# Patient Record
Sex: MALE | Race: WHITE | NOT HISPANIC OR LATINO | Employment: OTHER | URBAN - NONMETROPOLITAN AREA
[De-identification: names, ages, dates, MRNs, and addresses within clinical notes are randomized per-mention and may not be internally consistent; named-entity substitution may affect disease eponyms.]

---

## 2019-07-05 RX ORDER — SPIRONOLACTONE 25 MG/1
TABLET ORAL
COMMUNITY
Start: 2017-07-31 | End: 2021-04-28

## 2019-07-05 RX ORDER — PRAVASTATIN SODIUM 40 MG
TABLET ORAL EVERY 24 HOURS
COMMUNITY
Start: 2017-09-22 | End: 2019-07-08 | Stop reason: SDUPTHER

## 2019-07-05 RX ORDER — NITROGLYCERIN 0.4 MG/1
0.4 TABLET SUBLINGUAL
COMMUNITY
Start: 2013-12-26

## 2019-07-05 RX ORDER — OMEPRAZOLE 20 MG/1
20 CAPSULE, DELAYED RELEASE ORAL DAILY
COMMUNITY
Start: 2013-12-26

## 2019-07-05 RX ORDER — AMLODIPINE BESYLATE 5 MG/1
TABLET ORAL
COMMUNITY
Start: 2013-08-25 | End: 2021-12-14

## 2019-07-05 RX ORDER — ATENOLOL 50 MG/1
TABLET ORAL EVERY 24 HOURS
COMMUNITY
Start: 2017-10-11 | End: 2020-02-06 | Stop reason: SDUPTHER

## 2019-07-05 RX ORDER — ISOSORBIDE MONONITRATE 60 MG/1
TABLET, EXTENDED RELEASE ORAL EVERY 12 HOURS
COMMUNITY
Start: 2017-09-26 | End: 2020-04-08 | Stop reason: SDUPTHER

## 2019-07-05 RX ORDER — BUMETANIDE 0.5 MG/1
TABLET ORAL
COMMUNITY
Start: 2017-07-31 | End: 2020-11-10 | Stop reason: ALTCHOICE

## 2019-07-05 RX ORDER — ISOSORBIDE MONONITRATE 60 MG/1
TABLET, EXTENDED RELEASE ORAL
Qty: 180 TABLET | Refills: 2 | Status: SHIPPED | OUTPATIENT
Start: 2019-07-05 | End: 2020-04-08 | Stop reason: SDUPTHER

## 2019-07-05 RX ORDER — ASPIRIN 325 MG
TABLET ORAL
Status: ON HOLD | COMMUNITY
Start: 2013-12-26 | End: 2021-12-27

## 2019-07-05 RX ORDER — CLOPIDOGREL BISULFATE 75 MG/1
TABLET ORAL EVERY 24 HOURS
COMMUNITY
Start: 2017-09-22 | End: 2019-10-23 | Stop reason: SDUPTHER

## 2019-07-05 RX ORDER — ISOSORBIDE MONONITRATE 60 MG/1
TABLET, EXTENDED RELEASE ORAL EVERY 12 HOURS
Status: CANCELLED | OUTPATIENT
Start: 2019-07-05

## 2019-07-05 RX ORDER — ALBUTEROL SULFATE 2.5 MG/3ML
SOLUTION RESPIRATORY (INHALATION)
Status: ON HOLD | COMMUNITY
Start: 2013-12-26 | End: 2021-12-27

## 2019-07-08 RX ORDER — PRAVASTATIN SODIUM 40 MG
TABLET ORAL
Qty: 90 TABLET | Refills: 2 | Status: SHIPPED | OUTPATIENT
Start: 2019-07-08 | End: 2020-04-27 | Stop reason: SDUPTHER

## 2019-10-23 RX ORDER — CLOPIDOGREL BISULFATE 75 MG/1
TABLET ORAL
Qty: 90 TABLET | Refills: 3 | Status: SHIPPED | OUTPATIENT
Start: 2019-10-23 | End: 2020-10-13

## 2020-01-14 ENCOUNTER — OUTSIDE FACILITY SERVICE (OUTPATIENT)
Dept: CARDIOLOGY | Facility: CLINIC | Age: 75
End: 2020-01-14

## 2020-01-14 PROCEDURE — 93010 ELECTROCARDIOGRAM REPORT: CPT | Performed by: INTERNAL MEDICINE

## 2020-01-14 PROCEDURE — 99214 OFFICE O/P EST MOD 30 MIN: CPT | Performed by: INTERNAL MEDICINE

## 2020-02-06 RX ORDER — ATENOLOL 50 MG/1
50 TABLET ORAL EVERY 24 HOURS
Qty: 90 TABLET | Refills: 3 | Status: SHIPPED | OUTPATIENT
Start: 2020-02-06 | End: 2020-11-09 | Stop reason: SDUPTHER

## 2020-04-08 RX ORDER — ISOSORBIDE MONONITRATE 60 MG/1
60 TABLET, EXTENDED RELEASE ORAL 2 TIMES DAILY
Qty: 180 TABLET | Refills: 2 | Status: SHIPPED | OUTPATIENT
Start: 2020-04-08 | End: 2021-01-04

## 2020-04-27 RX ORDER — PRAVASTATIN SODIUM 40 MG
40 TABLET ORAL DAILY
Qty: 90 TABLET | Refills: 3 | Status: SHIPPED | OUTPATIENT
Start: 2020-04-27 | End: 2022-07-19 | Stop reason: SDUPTHER

## 2020-06-30 ENCOUNTER — TELEPHONE (OUTPATIENT)
Dept: CARDIOLOGY | Facility: CLINIC | Age: 75
End: 2020-06-30

## 2020-07-02 RX ORDER — TRIAMCINOLONE ACETONIDE 5 MG/G
1 OINTMENT TOPICAL 2 TIMES DAILY PRN
COMMUNITY
Start: 2019-01-10

## 2020-07-02 RX ORDER — METOPROLOL SUCCINATE 25 MG/1
TABLET, EXTENDED RELEASE ORAL
COMMUNITY
Start: 2018-06-06 | End: 2020-11-10 | Stop reason: ALTCHOICE

## 2020-07-02 RX ORDER — PANTOPRAZOLE SODIUM 40 MG/1
TABLET, DELAYED RELEASE ORAL
COMMUNITY
Start: 2018-06-06 | End: 2021-04-28

## 2020-07-02 RX ORDER — HYDROCODONE BITARTRATE AND ACETAMINOPHEN 7.5; 325 MG/1; MG/1
1 TABLET ORAL EVERY 6 HOURS PRN
COMMUNITY
Start: 2018-06-06

## 2020-07-02 NOTE — TELEPHONE ENCOUNTER
There was also a letter attached to cardiac clearance request from Dr Juliana Swenson Pre Admissions Testing [facility] , asking if Dr Burgos thinks pt should have echo or stress test before surgery.     Dr Burgos responded saying yes suggest echo/lexiscan myoview     I called Dr Swenson and LM asking her to call our office.      I put the papers on Sari's desk per her request.

## 2020-07-06 NOTE — TELEPHONE ENCOUNTER
Sari spoke with Dr Swenson's office and let them know that Dr Burgos did suggest an echo and stress test. She was told that they will order the testing and will call us back if they need anything else. Their request has been scanned in.

## 2020-10-13 RX ORDER — CLOPIDOGREL BISULFATE 75 MG/1
TABLET ORAL
Qty: 90 TABLET | Refills: 0 | Status: SHIPPED | OUTPATIENT
Start: 2020-10-13 | End: 2021-04-21

## 2020-11-10 RX ORDER — BUMETANIDE 1 MG/1
0.5 TABLET ORAL EVERY MORNING
COMMUNITY
Start: 2020-10-11

## 2020-11-10 RX ORDER — ATENOLOL 50 MG/1
50 TABLET ORAL EVERY 24 HOURS
Qty: 90 TABLET | Refills: 3 | Status: SHIPPED | OUTPATIENT
Start: 2020-11-10 | End: 2021-11-15 | Stop reason: SDUPTHER

## 2021-01-04 RX ORDER — ISOSORBIDE MONONITRATE 60 MG/1
TABLET, EXTENDED RELEASE ORAL
Qty: 180 TABLET | Refills: 0 | Status: SHIPPED | OUTPATIENT
Start: 2021-01-04 | End: 2021-01-05

## 2021-01-05 RX ORDER — ISOSORBIDE MONONITRATE 60 MG/1
TABLET, EXTENDED RELEASE ORAL
Qty: 180 TABLET | Refills: 0 | Status: SHIPPED | OUTPATIENT
Start: 2021-01-05 | End: 2021-03-30

## 2021-01-12 ENCOUNTER — OUTSIDE FACILITY SERVICE (OUTPATIENT)
Dept: CARDIOLOGY | Facility: CLINIC | Age: 76
End: 2021-01-12

## 2021-01-12 PROCEDURE — 93010 ELECTROCARDIOGRAM REPORT: CPT | Performed by: INTERNAL MEDICINE

## 2021-01-12 PROCEDURE — 99214 OFFICE O/P EST MOD 30 MIN: CPT | Performed by: INTERNAL MEDICINE

## 2021-01-15 ENCOUNTER — TELEPHONE (OUTPATIENT)
Dept: CARDIOLOGY | Facility: CLINIC | Age: 76
End: 2021-01-15

## 2021-01-15 RX ORDER — CHLORAL HYDRATE 500 MG
CAPSULE ORAL
COMMUNITY
End: 2021-12-14

## 2021-01-15 NOTE — TELEPHONE ENCOUNTER
Patient called stating that he was not able to get the Ranexa prescription because it was more than $200. Any alternatives?

## 2021-01-18 NOTE — TELEPHONE ENCOUNTER
Patient's wife states that the Amlodipine made the patient feel ill and made his chest hurt even more after just 1 dose. Please advise.

## 2021-01-18 NOTE — TELEPHONE ENCOUNTER
If stops the amlodipine the blood pressure needs to be monitored closely  Also if continues to have symptoms of chest pain schedule appointment for follow-up

## 2021-01-18 NOTE — TELEPHONE ENCOUNTER
Pt wife called back and stated pt has been taking isosorbide 60mg BID for a long time.  Stated had CP last night and it woke him out of his sleep and he had to take nitro 2 times.  He has only had this CP wake him up out of his sleep since he started the amlodipine but he stopped taking it after the 2nd day of trying it.  Please advise.

## 2021-03-30 ENCOUNTER — OUTSIDE FACILITY SERVICE (OUTPATIENT)
Dept: CARDIOLOGY | Facility: CLINIC | Age: 76
End: 2021-03-30

## 2021-03-30 PROCEDURE — 99214 OFFICE O/P EST MOD 30 MIN: CPT | Performed by: INTERNAL MEDICINE

## 2021-03-30 RX ORDER — ISOSORBIDE MONONITRATE 60 MG/1
TABLET, EXTENDED RELEASE ORAL
Qty: 180 TABLET | Refills: 0 | Status: SHIPPED | OUTPATIENT
Start: 2021-03-30 | End: 2021-07-01

## 2021-04-06 ENCOUNTER — OUTSIDE FACILITY SERVICE (OUTPATIENT)
Dept: CARDIOLOGY | Facility: CLINIC | Age: 76
End: 2021-04-06

## 2021-04-06 PROCEDURE — 93306 TTE W/DOPPLER COMPLETE: CPT | Performed by: INTERNAL MEDICINE

## 2021-04-21 RX ORDER — CLOPIDOGREL BISULFATE 75 MG/1
TABLET ORAL
Qty: 90 TABLET | Refills: 0 | Status: SHIPPED | OUTPATIENT
Start: 2021-04-21 | End: 2021-07-19

## 2021-04-27 ENCOUNTER — OUTSIDE FACILITY SERVICE (OUTPATIENT)
Dept: CARDIOLOGY | Facility: CLINIC | Age: 76
End: 2021-04-27

## 2021-04-27 PROCEDURE — 99213 OFFICE O/P EST LOW 20 MIN: CPT | Performed by: INTERNAL MEDICINE

## 2021-07-01 RX ORDER — ISOSORBIDE MONONITRATE 60 MG/1
TABLET, EXTENDED RELEASE ORAL
Qty: 180 TABLET | Refills: 0 | Status: SHIPPED | OUTPATIENT
Start: 2021-07-01 | End: 2021-10-14 | Stop reason: SDUPTHER

## 2021-07-19 RX ORDER — CLOPIDOGREL BISULFATE 75 MG/1
TABLET ORAL
Qty: 90 TABLET | Refills: 3 | Status: SHIPPED | OUTPATIENT
Start: 2021-07-19 | End: 2022-07-11 | Stop reason: SDUPTHER

## 2021-10-14 RX ORDER — ISOSORBIDE MONONITRATE 60 MG/1
60 TABLET, EXTENDED RELEASE ORAL 2 TIMES DAILY
Qty: 180 TABLET | Refills: 0 | Status: SHIPPED | OUTPATIENT
Start: 2021-10-14 | End: 2022-01-10

## 2021-11-15 ENCOUNTER — TELEPHONE (OUTPATIENT)
Dept: CARDIOLOGY | Facility: CLINIC | Age: 76
End: 2021-11-15

## 2021-11-15 RX ORDER — ATENOLOL 50 MG/1
50 TABLET ORAL EVERY 24 HOURS
Qty: 90 TABLET | Refills: 3 | Status: SHIPPED | OUTPATIENT
Start: 2021-11-15 | End: 2022-11-14

## 2021-12-07 ENCOUNTER — OUTSIDE FACILITY SERVICE (OUTPATIENT)
Dept: CARDIOLOGY | Facility: CLINIC | Age: 76
End: 2021-12-07

## 2021-12-07 DIAGNOSIS — R79.1 ABNORMAL COAGULATION PROFILE: ICD-10-CM

## 2021-12-07 DIAGNOSIS — I20.8 STABLE ANGINA PECTORIS (HCC): Primary | ICD-10-CM

## 2021-12-07 PROCEDURE — 99214 OFFICE O/P EST MOD 30 MIN: CPT | Performed by: INTERNAL MEDICINE

## 2021-12-09 PROBLEM — I20.89 STABLE ANGINA PECTORIS: Status: ACTIVE | Noted: 2021-12-09

## 2021-12-09 PROBLEM — I20.8 STABLE ANGINA PECTORIS (HCC): Status: ACTIVE | Noted: 2021-12-09

## 2021-12-14 RX ORDER — SPIRONOLACTONE 25 MG/1
25 TABLET ORAL DAILY
COMMUNITY

## 2021-12-27 ENCOUNTER — HOSPITAL ENCOUNTER (OUTPATIENT)
Dept: GENERAL RADIOLOGY | Facility: HOSPITAL | Age: 76
Discharge: HOME | End: 2021-12-27

## 2021-12-27 ENCOUNTER — HOSPITAL ENCOUNTER (OUTPATIENT)
Facility: HOSPITAL | Age: 76
Setting detail: HOSPITAL OUTPATIENT SURGERY
Discharge: HOME | End: 2021-12-28
Attending: INTERNAL MEDICINE | Admitting: INTERNAL MEDICINE

## 2021-12-27 DIAGNOSIS — I20.8 STABLE ANGINA PECTORIS: ICD-10-CM

## 2021-12-27 DIAGNOSIS — I20.8 STABLE ANGINA PECTORIS (HCC): ICD-10-CM

## 2021-12-27 LAB
ANION GAP SERPL CALCULATED.3IONS-SCNC: 11 MMOL/L (ref 5–15)
BASOPHILS # BLD AUTO: 0.1 10*3/MM3 (ref 0–0.2)
BASOPHILS NFR BLD AUTO: 1.3 % (ref 0–1.5)
BUN SERPL-MCNC: 23 MG/DL (ref 8–23)
BUN/CREAT SERPL: 17.3 (ref 7–25)
CALCIUM SPEC-SCNC: 9.5 MG/DL (ref 8.6–10.5)
CHLORIDE SERPL-SCNC: 100 MMOL/L (ref 98–107)
CO2 SERPL-SCNC: 27 MMOL/L (ref 22–29)
CREAT SERPL-MCNC: 1.33 MG/DL (ref 0.76–1.27)
DEPRECATED RDW RBC AUTO: 46.4 FL (ref 37–54)
EOSINOPHIL # BLD AUTO: 0.3 10*3/MM3 (ref 0–0.4)
EOSINOPHIL NFR BLD AUTO: 4.5 % (ref 0.3–6.2)
ERYTHROCYTE [DISTWIDTH] IN BLOOD BY AUTOMATED COUNT: 14.4 % (ref 12.3–15.4)
GFR SERPL CREATININE-BSD FRML MDRD: 52 ML/MIN/1.73
GLUCOSE BLDC GLUCOMTR-MCNC: 128 MG/DL (ref 70–105)
GLUCOSE SERPL-MCNC: 114 MG/DL (ref 65–99)
HCT VFR BLD AUTO: 41.4 % (ref 37.5–51)
HGB BLD-MCNC: 13.6 G/DL (ref 13–17.7)
INR PPP: 0.93 (ref 0.93–1.1)
LYMPHOCYTES # BLD AUTO: 1.4 10*3/MM3 (ref 0.7–3.1)
LYMPHOCYTES NFR BLD AUTO: 22.8 % (ref 19.6–45.3)
MCH RBC QN AUTO: 30.3 PG (ref 26.6–33)
MCHC RBC AUTO-ENTMCNC: 32.9 G/DL (ref 31.5–35.7)
MCV RBC AUTO: 92.1 FL (ref 79–97)
MONOCYTES # BLD AUTO: 0.6 10*3/MM3 (ref 0.1–0.9)
MONOCYTES NFR BLD AUTO: 10 % (ref 5–12)
NEUTROPHILS NFR BLD AUTO: 3.9 10*3/MM3 (ref 1.7–7)
NEUTROPHILS NFR BLD AUTO: 61.4 % (ref 42.7–76)
NRBC BLD AUTO-RTO: 0.2 /100 WBC (ref 0–0.2)
PLATELET # BLD AUTO: 187 10*3/MM3 (ref 140–450)
PMV BLD AUTO: 9 FL (ref 6–12)
POTASSIUM SERPL-SCNC: 5 MMOL/L (ref 3.5–5.2)
PROTHROMBIN TIME: 10.4 SECONDS (ref 9.6–11.7)
RBC # BLD AUTO: 4.49 10*6/MM3 (ref 4.14–5.8)
SARS-COV-2 RNA PNL SPEC NAA+PROBE: NOT DETECTED
SODIUM SERPL-SCNC: 138 MMOL/L (ref 136–145)
WBC NRBC COR # BLD: 6.3 10*3/MM3 (ref 3.4–10.8)

## 2021-12-27 PROCEDURE — 85610 PROTHROMBIN TIME: CPT | Performed by: INTERNAL MEDICINE

## 2021-12-27 PROCEDURE — 99152 MOD SED SAME PHYS/QHP 5/>YRS: CPT | Performed by: INTERNAL MEDICINE

## 2021-12-27 PROCEDURE — 87635 SARS-COV-2 COVID-19 AMP PRB: CPT | Performed by: INTERNAL MEDICINE

## 2021-12-27 PROCEDURE — C1769 GUIDE WIRE: HCPCS | Performed by: INTERNAL MEDICINE

## 2021-12-27 PROCEDURE — 71046 X-RAY EXAM CHEST 2 VIEWS: CPT

## 2021-12-27 PROCEDURE — 85025 COMPLETE CBC W/AUTO DIFF WBC: CPT | Performed by: INTERNAL MEDICINE

## 2021-12-27 PROCEDURE — 93455 CORONARY ART/GRFT ANGIO S&I: CPT | Performed by: INTERNAL MEDICINE

## 2021-12-27 PROCEDURE — 25010000002 MIDAZOLAM PER 1 MG: Performed by: INTERNAL MEDICINE

## 2021-12-27 PROCEDURE — C1894 INTRO/SHEATH, NON-LASER: HCPCS | Performed by: INTERNAL MEDICINE

## 2021-12-27 PROCEDURE — 99153 MOD SED SAME PHYS/QHP EA: CPT | Performed by: INTERNAL MEDICINE

## 2021-12-27 PROCEDURE — 82962 GLUCOSE BLOOD TEST: CPT

## 2021-12-27 PROCEDURE — 25010000002 FENTANYL CITRATE (PF) 100 MCG/2ML SOLUTION: Performed by: INTERNAL MEDICINE

## 2021-12-27 PROCEDURE — 0 IOPAMIDOL PER 1 ML: Performed by: INTERNAL MEDICINE

## 2021-12-27 PROCEDURE — C9803 HOPD COVID-19 SPEC COLLECT: HCPCS

## 2021-12-27 PROCEDURE — 80048 BASIC METABOLIC PNL TOTAL CA: CPT | Performed by: INTERNAL MEDICINE

## 2021-12-27 PROCEDURE — S0260 H&P FOR SURGERY: HCPCS | Performed by: INTERNAL MEDICINE

## 2021-12-27 RX ORDER — LIDOCAINE HYDROCHLORIDE 20 MG/ML
INJECTION, SOLUTION INFILTRATION; PERINEURAL AS NEEDED
Status: DISCONTINUED | OUTPATIENT
Start: 2021-12-27 | End: 2021-12-27 | Stop reason: HOSPADM

## 2021-12-27 RX ORDER — SODIUM CHLORIDE 9 MG/ML
100 INJECTION, SOLUTION INTRAVENOUS CONTINUOUS
Status: DISCONTINUED | OUTPATIENT
Start: 2021-12-27 | End: 2021-12-28 | Stop reason: HOSPADM

## 2021-12-27 RX ORDER — ALBUTEROL SULFATE 2.5 MG/3ML
2.5 SOLUTION RESPIRATORY (INHALATION) EVERY 4 HOURS PRN
COMMUNITY

## 2021-12-27 RX ORDER — MIDAZOLAM HYDROCHLORIDE 1 MG/ML
INJECTION INTRAMUSCULAR; INTRAVENOUS AS NEEDED
Status: DISCONTINUED | OUTPATIENT
Start: 2021-12-27 | End: 2021-12-27 | Stop reason: HOSPADM

## 2021-12-27 RX ORDER — ASPIRIN 325 MG
325 TABLET ORAL DAILY
COMMUNITY

## 2021-12-27 RX ORDER — RANOLAZINE 500 MG/1
500 TABLET, EXTENDED RELEASE ORAL 2 TIMES DAILY
Qty: 60 TABLET | Refills: 2 | Status: SHIPPED | OUTPATIENT
Start: 2021-12-27 | End: 2022-03-25

## 2021-12-27 RX ORDER — FENTANYL CITRATE 50 UG/ML
INJECTION, SOLUTION INTRAMUSCULAR; INTRAVENOUS AS NEEDED
Status: DISCONTINUED | OUTPATIENT
Start: 2021-12-27 | End: 2021-12-27 | Stop reason: HOSPADM

## 2021-12-27 RX ADMIN — SODIUM CHLORIDE 30 ML/HR: 9 INJECTION, SOLUTION INTRAVENOUS at 13:30

## 2021-12-28 VITALS
TEMPERATURE: 96.9 F | HEART RATE: 59 BPM | SYSTOLIC BLOOD PRESSURE: 143 MMHG | WEIGHT: 214.07 LBS | DIASTOLIC BLOOD PRESSURE: 71 MMHG | OXYGEN SATURATION: 94 % | RESPIRATION RATE: 18 BRPM | HEIGHT: 69 IN | BODY MASS INDEX: 31.71 KG/M2

## 2022-01-10 RX ORDER — ISOSORBIDE MONONITRATE 60 MG/1
TABLET, EXTENDED RELEASE ORAL
Qty: 180 TABLET | Refills: 0 | Status: SHIPPED | OUTPATIENT
Start: 2022-01-10 | End: 2022-03-25

## 2022-03-25 RX ORDER — RANOLAZINE 500 MG/1
TABLET, EXTENDED RELEASE ORAL
Qty: 60 TABLET | Refills: 0 | Status: SHIPPED | OUTPATIENT
Start: 2022-03-25 | End: 2022-05-02

## 2022-03-25 RX ORDER — ISOSORBIDE MONONITRATE 60 MG/1
TABLET, EXTENDED RELEASE ORAL
Qty: 180 TABLET | Refills: 0 | Status: SHIPPED | OUTPATIENT
Start: 2022-03-25 | End: 2022-07-11

## 2022-03-25 RX ORDER — ESZOPICLONE 2 MG/1
2 TABLET, FILM COATED ORAL
COMMUNITY
Start: 2022-02-11

## 2022-05-02 RX ORDER — RANOLAZINE 500 MG/1
TABLET, EXTENDED RELEASE ORAL
Qty: 60 TABLET | Refills: 1 | Status: SHIPPED | OUTPATIENT
Start: 2022-05-02

## 2022-07-11 RX ORDER — CLOPIDOGREL BISULFATE 75 MG/1
75 TABLET ORAL DAILY
Qty: 90 TABLET | Refills: 0 | Status: SHIPPED | OUTPATIENT
Start: 2022-07-11 | End: 2022-10-03

## 2022-07-11 RX ORDER — ISOSORBIDE MONONITRATE 60 MG/1
TABLET, EXTENDED RELEASE ORAL
Qty: 180 TABLET | Refills: 0 | Status: SHIPPED | OUTPATIENT
Start: 2022-07-11 | End: 2022-10-03

## 2022-07-19 ENCOUNTER — OUTSIDE FACILITY SERVICE (OUTPATIENT)
Dept: CARDIOLOGY | Facility: CLINIC | Age: 77
End: 2022-07-19

## 2022-07-19 PROCEDURE — 99214 OFFICE O/P EST MOD 30 MIN: CPT | Performed by: INTERNAL MEDICINE

## 2022-07-19 RX ORDER — PRAVASTATIN SODIUM 40 MG
40 TABLET ORAL DAILY
Qty: 90 TABLET | Refills: 3 | Status: SHIPPED | OUTPATIENT
Start: 2022-07-19

## 2022-10-03 RX ORDER — ISOSORBIDE MONONITRATE 60 MG/1
TABLET, EXTENDED RELEASE ORAL
Qty: 180 TABLET | Refills: 0 | Status: SHIPPED | OUTPATIENT
Start: 2022-10-03 | End: 2023-01-03

## 2022-10-03 RX ORDER — CLOPIDOGREL BISULFATE 75 MG/1
TABLET ORAL
Qty: 90 TABLET | Refills: 0 | Status: SHIPPED | OUTPATIENT
Start: 2022-10-03 | End: 2022-10-17

## 2022-10-17 RX ORDER — CLOPIDOGREL BISULFATE 75 MG/1
TABLET ORAL
Qty: 90 TABLET | Refills: 0 | Status: SHIPPED | OUTPATIENT
Start: 2022-10-17

## 2022-11-14 RX ORDER — ATENOLOL 50 MG/1
TABLET ORAL
Qty: 90 TABLET | Refills: 1 | Status: SHIPPED | OUTPATIENT
Start: 2022-11-14

## 2023-01-03 RX ORDER — ISOSORBIDE MONONITRATE 60 MG/1
TABLET, EXTENDED RELEASE ORAL
Qty: 180 TABLET | Refills: 1 | Status: SHIPPED | OUTPATIENT
Start: 2023-01-03

## 2023-04-17 RX ORDER — CLOPIDOGREL BISULFATE 75 MG/1
TABLET ORAL
Qty: 90 TABLET | Refills: 1 | Status: SHIPPED | OUTPATIENT
Start: 2023-04-17

## 2023-04-25 RX ORDER — ATENOLOL 50 MG/1
50 TABLET ORAL DAILY
Qty: 90 TABLET | Refills: 3 | Status: SHIPPED | OUTPATIENT
Start: 2023-04-25

## 2023-06-19 RX ORDER — ISOSORBIDE MONONITRATE 60 MG/1
TABLET, EXTENDED RELEASE ORAL
Qty: 180 TABLET | Refills: 0 | Status: SHIPPED | OUTPATIENT
Start: 2023-06-19

## 2023-09-11 RX ORDER — ISOSORBIDE MONONITRATE 60 MG/1
TABLET, EXTENDED RELEASE ORAL
Qty: 180 TABLET | Refills: 0 | Status: SHIPPED | OUTPATIENT
Start: 2023-09-11

## 2023-09-26 ENCOUNTER — OUTSIDE FACILITY SERVICE (OUTPATIENT)
Dept: CARDIOLOGY | Facility: CLINIC | Age: 78
End: 2023-09-26
Payer: MEDICARE

## 2023-09-26 PROCEDURE — 99214 OFFICE O/P EST MOD 30 MIN: CPT | Performed by: INTERNAL MEDICINE

## 2023-10-30 RX ORDER — CLOPIDOGREL BISULFATE 75 MG/1
TABLET ORAL
Qty: 90 TABLET | Refills: 1 | Status: SHIPPED | OUTPATIENT
Start: 2023-10-30

## 2023-10-31 RX ORDER — PRAVASTATIN SODIUM 40 MG
TABLET ORAL
Qty: 90 TABLET | Refills: 1 | Status: SHIPPED | OUTPATIENT
Start: 2023-10-31

## 2023-12-14 RX ORDER — ISOSORBIDE MONONITRATE 60 MG/1
TABLET, EXTENDED RELEASE ORAL
Qty: 180 TABLET | Refills: 0 | Status: SHIPPED | OUTPATIENT
Start: 2023-12-14

## 2024-01-03 ENCOUNTER — TELEPHONE (OUTPATIENT)
Dept: CARDIOLOGY | Facility: CLINIC | Age: 79
End: 2024-01-03
Payer: MEDICARE

## 2024-01-03 NOTE — TELEPHONE ENCOUNTER
Hub staff attempted to follow warm transfer process and was unsuccessful     Caller: Parker Guerrier    Relationship to patient: Self    Best call back number: [phone]    Patient is needing: THERE IS A CARDIAC CLEARANCE FORM NEEDING TO BE FILLED OUT IN THE MEDIA TAB. PLEASE FILL OUT AND SEND BACK OVER.

## 2024-01-09 RX ORDER — ISOSORBIDE MONONITRATE 60 MG/1
TABLET, EXTENDED RELEASE ORAL
Qty: 180 TABLET | Refills: 0 | Status: SHIPPED | OUTPATIENT
Start: 2024-01-09

## 2024-01-15 NOTE — TELEPHONE ENCOUNTER
Caller: Parker Guerrier    Relationship to patient: Self    Best call back number: [phone]    Patient is needing: REQUESTING THIS BE FAXED AGAIN. OFFICE DID NOT RECEIVE FAX.

## 2024-04-15 RX ORDER — PRAVASTATIN SODIUM 40 MG
TABLET ORAL
Qty: 90 TABLET | Refills: 1 | Status: SHIPPED | OUTPATIENT
Start: 2024-04-15

## 2024-04-15 RX ORDER — ATENOLOL 50 MG/1
50 TABLET ORAL DAILY
Qty: 90 TABLET | Refills: 0 | Status: SHIPPED | OUTPATIENT
Start: 2024-04-15

## 2024-04-23 RX ORDER — NITROGLYCERIN 0.4 MG/1
0.4 TABLET SUBLINGUAL
Qty: 30 TABLET | Refills: 2 | Status: SHIPPED | OUTPATIENT
Start: 2024-04-23

## 2024-04-25 RX ORDER — CLOPIDOGREL BISULFATE 75 MG/1
TABLET ORAL
Qty: 90 TABLET | Refills: 0 | Status: SHIPPED | OUTPATIENT
Start: 2024-04-25 | End: 2024-04-25

## 2024-04-25 RX ORDER — CLOPIDOGREL BISULFATE 75 MG/1
TABLET ORAL
Qty: 90 TABLET | Refills: 0 | Status: SHIPPED | OUTPATIENT
Start: 2024-04-25

## 2024-06-11 RX ORDER — ISOSORBIDE MONONITRATE 60 MG/1
TABLET, EXTENDED RELEASE ORAL
Qty: 180 TABLET | Refills: 2 | Status: SHIPPED | OUTPATIENT
Start: 2024-06-11

## 2024-07-16 RX ORDER — ATENOLOL 50 MG/1
50 TABLET ORAL DAILY
Qty: 90 TABLET | Refills: 1 | Status: SHIPPED | OUTPATIENT
Start: 2024-07-16

## 2024-10-15 RX ORDER — PRAVASTATIN SODIUM 40 MG
TABLET ORAL
Qty: 90 TABLET | Refills: 1 | Status: SHIPPED | OUTPATIENT
Start: 2024-10-15

## 2024-10-21 RX ORDER — CLOPIDOGREL BISULFATE 75 MG/1
TABLET ORAL
Qty: 90 TABLET | Refills: 0 | Status: SHIPPED | OUTPATIENT
Start: 2024-10-21

## 2025-01-14 RX ORDER — EZETIMIBE 10 MG/1
10 TABLET ORAL DAILY
Qty: 90 TABLET | Refills: 3 | Status: SHIPPED | OUTPATIENT
Start: 2025-01-14

## 2025-01-15 RX ORDER — CLOPIDOGREL BISULFATE 75 MG/1
TABLET ORAL
Qty: 90 TABLET | Refills: 1 | Status: SHIPPED | OUTPATIENT
Start: 2025-01-15

## 2025-01-15 RX ORDER — ATENOLOL 50 MG/1
50 TABLET ORAL DAILY
Qty: 90 TABLET | Refills: 1 | Status: SHIPPED | OUTPATIENT
Start: 2025-01-15

## 2025-01-20 RX ORDER — ASPIRIN 81 MG/1
81 TABLET ORAL DAILY
COMMUNITY

## 2025-01-29 ENCOUNTER — APPOINTMENT (OUTPATIENT)
Dept: GENERAL RADIOLOGY | Facility: HOSPITAL | Age: 80
End: 2025-01-29
Payer: MEDICARE

## 2025-01-29 ENCOUNTER — HOSPITAL ENCOUNTER (INPATIENT)
Facility: HOSPITAL | Age: 80
LOS: 3 days | Discharge: HOME | End: 2025-02-01
Attending: STUDENT IN AN ORGANIZED HEALTH CARE EDUCATION/TRAINING PROGRAM | Admitting: INTERNAL MEDICINE
Payer: MEDICARE

## 2025-01-29 DIAGNOSIS — I20.0 UNSTABLE ANGINA PECTORIS: Primary | ICD-10-CM

## 2025-01-29 PROBLEM — R07.9 CHEST PAIN: Status: ACTIVE | Noted: 2025-01-29

## 2025-01-29 LAB
ALBUMIN SERPL-MCNC: 3.7 G/DL (ref 3.5–5.2)
ALBUMIN/GLOB SERPL: 1.6 G/DL
ALP SERPL-CCNC: 69 U/L (ref 39–117)
ALT SERPL W P-5'-P-CCNC: 8 U/L (ref 1–41)
ANION GAP SERPL CALCULATED.3IONS-SCNC: 12.3 MMOL/L (ref 5–15)
APTT PPP: 36.7 SECONDS (ref 22.7–35.4)
AST SERPL-CCNC: 22 U/L (ref 1–40)
BILIRUB SERPL-MCNC: 0.2 MG/DL (ref 0–1.2)
BUN SERPL-MCNC: 27 MG/DL (ref 8–23)
BUN/CREAT SERPL: 18.1 (ref 7–25)
CALCIUM SPEC-SCNC: 8.6 MG/DL (ref 8.6–10.5)
CHLORIDE SERPL-SCNC: 100 MMOL/L (ref 98–107)
CO2 SERPL-SCNC: 23.7 MMOL/L (ref 22–29)
CREAT SERPL-MCNC: 1.49 MG/DL (ref 0.76–1.27)
EGFRCR SERPLBLD CKD-EPI 2021: 47.4 ML/MIN/1.73
GLOBULIN UR ELPH-MCNC: 2.3 GM/DL
GLUCOSE SERPL-MCNC: 97 MG/DL (ref 65–99)
INR PPP: 1.19 (ref 0.9–1.1)
LIPASE SERPL-CCNC: 20 U/L (ref 13–60)
POTASSIUM SERPL-SCNC: 5 MMOL/L (ref 3.5–5.2)
PROT SERPL-MCNC: 6 G/DL (ref 6–8.5)
PROTHROMBIN TIME: 15.2 SECONDS (ref 11.7–14.2)
SODIUM SERPL-SCNC: 136 MMOL/L (ref 136–145)
TROPONIN T SERPL HS-MCNC: 25 NG/L

## 2025-01-29 PROCEDURE — 80053 COMPREHEN METABOLIC PANEL: CPT | Performed by: PHYSICIAN ASSISTANT

## 2025-01-29 PROCEDURE — 83690 ASSAY OF LIPASE: CPT | Performed by: PHYSICIAN ASSISTANT

## 2025-01-29 PROCEDURE — 0202U NFCT DS 22 TRGT SARS-COV-2: CPT | Performed by: PHYSICIAN ASSISTANT

## 2025-01-29 PROCEDURE — 85730 THROMBOPLASTIN TIME PARTIAL: CPT | Performed by: PHYSICIAN ASSISTANT

## 2025-01-29 PROCEDURE — 93010 ELECTROCARDIOGRAM REPORT: CPT | Performed by: INTERNAL MEDICINE

## 2025-01-29 PROCEDURE — 85007 BL SMEAR W/DIFF WBC COUNT: CPT | Performed by: PHYSICIAN ASSISTANT

## 2025-01-29 PROCEDURE — 71045 X-RAY EXAM CHEST 1 VIEW: CPT

## 2025-01-29 PROCEDURE — 93005 ELECTROCARDIOGRAM TRACING: CPT | Performed by: PHYSICIAN ASSISTANT

## 2025-01-29 PROCEDURE — 84484 ASSAY OF TROPONIN QUANT: CPT | Performed by: PHYSICIAN ASSISTANT

## 2025-01-29 PROCEDURE — 85027 COMPLETE CBC AUTOMATED: CPT | Performed by: PHYSICIAN ASSISTANT

## 2025-01-29 PROCEDURE — 85610 PROTHROMBIN TIME: CPT | Performed by: PHYSICIAN ASSISTANT

## 2025-01-29 PROCEDURE — 25010000002 NITROGLYCERIN 200 MCG/ML SOLUTION: Performed by: PHYSICIAN ASSISTANT

## 2025-01-29 RX ORDER — AMOXICILLIN 250 MG
2 CAPSULE ORAL 2 TIMES DAILY PRN
Status: DISCONTINUED | OUTPATIENT
Start: 2025-01-29 | End: 2025-02-01 | Stop reason: HOSPADM

## 2025-01-29 RX ORDER — NITROGLYCERIN 0.4 MG/1
0.4 TABLET SUBLINGUAL
Status: DISCONTINUED | OUTPATIENT
Start: 2025-01-29 | End: 2025-02-01 | Stop reason: HOSPADM

## 2025-01-29 RX ORDER — NITROGLYCERIN 20 MG/100ML
10-50 INJECTION INTRAVENOUS
Status: DISCONTINUED | OUTPATIENT
Start: 2025-01-30 | End: 2025-01-31

## 2025-01-29 RX ORDER — SODIUM CHLORIDE 0.9 % (FLUSH) 0.9 %
10 SYRINGE (ML) INJECTION EVERY 12 HOURS SCHEDULED
Status: DISCONTINUED | OUTPATIENT
Start: 2025-01-29 | End: 2025-02-01 | Stop reason: HOSPADM

## 2025-01-29 RX ORDER — HYDROMORPHONE HYDROCHLORIDE 1 MG/ML
0.5 INJECTION, SOLUTION INTRAMUSCULAR; INTRAVENOUS; SUBCUTANEOUS
Status: DISCONTINUED | OUTPATIENT
Start: 2025-01-29 | End: 2025-02-01 | Stop reason: HOSPADM

## 2025-01-29 RX ORDER — SODIUM CHLORIDE 0.9 % (FLUSH) 0.9 %
10 SYRINGE (ML) INJECTION AS NEEDED
Status: DISCONTINUED | OUTPATIENT
Start: 2025-01-29 | End: 2025-02-01 | Stop reason: HOSPADM

## 2025-01-29 RX ORDER — SODIUM CHLORIDE 9 MG/ML
40 INJECTION, SOLUTION INTRAVENOUS AS NEEDED
Status: DISCONTINUED | OUTPATIENT
Start: 2025-01-29 | End: 2025-02-01 | Stop reason: HOSPADM

## 2025-01-29 RX ORDER — POLYETHYLENE GLYCOL 3350 17 G/17G
17 POWDER, FOR SOLUTION ORAL DAILY PRN
Status: DISCONTINUED | OUTPATIENT
Start: 2025-01-29 | End: 2025-02-01 | Stop reason: HOSPADM

## 2025-01-29 RX ORDER — ACETAMINOPHEN 325 MG/1
650 TABLET ORAL EVERY 4 HOURS PRN
Status: DISCONTINUED | OUTPATIENT
Start: 2025-01-29 | End: 2025-02-01 | Stop reason: HOSPADM

## 2025-01-29 RX ORDER — BISACODYL 5 MG/1
5 TABLET, DELAYED RELEASE ORAL DAILY PRN
Status: DISCONTINUED | OUTPATIENT
Start: 2025-01-29 | End: 2025-02-01 | Stop reason: HOSPADM

## 2025-01-29 RX ORDER — BISACODYL 10 MG
10 SUPPOSITORY, RECTAL RECTAL DAILY PRN
Status: DISCONTINUED | OUTPATIENT
Start: 2025-01-29 | End: 2025-02-01 | Stop reason: HOSPADM

## 2025-01-29 RX ADMIN — Medication 10 ML: at 23:31

## 2025-01-29 RX ADMIN — NITROGLYCERIN 100 MCG/MIN: 20 INJECTION INTRAVENOUS at 23:23

## 2025-01-29 RX ADMIN — MUPIROCIN 1 APPLICATION: 20 OINTMENT TOPICAL at 23:30

## 2025-01-29 NOTE — Clinical Note
correction and facilitation  Verbal Consent: Writer verbally educated and received verbal consent for hand placement, positioning of patient, and techniques to be performed today from patient          ASSESSMENT   Progress: progressing toward goals    Discharge needs based on today's assessment:   - Current level of function: significantly below baseline level of function   - Therapy needs at discharge: therapy 5 or more times per week   - Activities of daily living (ADLs) requiring support at discharge: bed mobility, transfers and ambulation   - Impairments that require further therapy  intervention: activity tolerance, balance, cognition and strength    AM-PAC  - Generalized Prior Level of Function: IND/MOD I ([facility] 22-24)       Key: 

## 2025-01-30 ENCOUNTER — APPOINTMENT (OUTPATIENT)
Dept: CARDIOLOGY | Facility: HOSPITAL | Age: 80
End: 2025-01-30
Payer: MEDICARE

## 2025-01-30 ENCOUNTER — APPOINTMENT (OUTPATIENT)
Dept: CT IMAGING | Facility: HOSPITAL | Age: 80
End: 2025-01-30
Payer: MEDICARE

## 2025-01-30 LAB
AORTIC DIMENSIONLESS INDEX: 0.6 (DI)
APTT PPP: 111.6 SECONDS (ref 22.7–35.4)
APTT PPP: 40.8 SECONDS (ref 22.7–35.4)
APTT PPP: 79.7 SECONDS (ref 22.7–35.4)
AV MEAN PRESS GRAD SYS DOP V1V2: 9 MMHG
AV VMAX SYS DOP: 200 CM/SEC
B PARAPERT DNA SPEC QL NAA+PROBE: NOT DETECTED
B PERT DNA SPEC QL NAA+PROBE: NOT DETECTED
BASOPHILS # BLD MANUAL: 0.05 10*3/MM3 (ref 0–0.2)
BASOPHILS NFR BLD MANUAL: 1 % (ref 0–1.5)
BH CV ECHO MEAS - ACS: 1.8 CM
BH CV ECHO MEAS - AO MAX PG: 16 MMHG
BH CV ECHO MEAS - AO V2 VTI: 41.4 CM
BH CV ECHO MEAS - AVA(I,D): 2.7 CM2
BH CV ECHO MEAS - EDV(CUBED): 140.6 ML
BH CV ECHO MEAS - EDV(MOD-SP2): 164 ML
BH CV ECHO MEAS - EDV(MOD-SP4): 161 ML
BH CV ECHO MEAS - EF(MOD-SP2): 69.7 %
BH CV ECHO MEAS - EF(MOD-SP4): 70.7 %
BH CV ECHO MEAS - ESV(CUBED): 29.8 ML
BH CV ECHO MEAS - ESV(MOD-SP2): 49.7 ML
BH CV ECHO MEAS - ESV(MOD-SP4): 47.2 ML
BH CV ECHO MEAS - FS: 40.4 %
BH CV ECHO MEAS - IVS/LVPW: 0.89 CM
BH CV ECHO MEAS - IVSD: 0.8 CM
BH CV ECHO MEAS - LA DIMENSION: 4.4 CM
BH CV ECHO MEAS - LAT PEAK E' VEL: 10.9 CM/SEC
BH CV ECHO MEAS - LV DIASTOLIC VOL/BSA (35-75): 77.3 CM2
BH CV ECHO MEAS - LV MASS(C)D: 156.9 GRAMS
BH CV ECHO MEAS - LV MAX PG: 5.8 MMHG
BH CV ECHO MEAS - LV MEAN PG: 3 MMHG
BH CV ECHO MEAS - LV SYSTOLIC VOL/BSA (12-30): 22.7 CM2
BH CV ECHO MEAS - LV V1 MAX: 120 CM/SEC
BH CV ECHO MEAS - LV V1 VTI: 24.4 CM
BH CV ECHO MEAS - LVIDD: 5.2 CM
BH CV ECHO MEAS - LVIDS: 3.1 CM
BH CV ECHO MEAS - LVOT AREA: 4.5 CM2
BH CV ECHO MEAS - LVOT DIAM: 2.4 CM
BH CV ECHO MEAS - LVPWD: 0.9 CM
BH CV ECHO MEAS - MED PEAK E' VEL: 6 CM/SEC
BH CV ECHO MEAS - MR MAX PG: 53.3 MMHG
BH CV ECHO MEAS - MR MAX VEL: 365 CM/SEC
BH CV ECHO MEAS - MV A MAX VEL: 67.3 CM/SEC
BH CV ECHO MEAS - MV DEC SLOPE: 372 CM/SEC2
BH CV ECHO MEAS - MV DEC TIME: 0.19 SEC
BH CV ECHO MEAS - MV E MAX VEL: 96.7 CM/SEC
BH CV ECHO MEAS - MV E/A: 1.44
BH CV ECHO MEAS - MV MAX PG: 5.3 MMHG
BH CV ECHO MEAS - MV MEAN PG: 2 MMHG
BH CV ECHO MEAS - MV P1/2T: 71.4 MSEC
BH CV ECHO MEAS - MV V2 VTI: 31.9 CM
BH CV ECHO MEAS - MVA(P1/2T): 3.1 CM2
BH CV ECHO MEAS - MVA(VTI): 3.5 CM2
BH CV ECHO MEAS - PA ACC TIME: 0.16 SEC
BH CV ECHO MEAS - PA V2 MAX: 93.4 CM/SEC
BH CV ECHO MEAS - RAP SYSTOLE: 3 MMHG
BH CV ECHO MEAS - RV MAX PG: 1.13 MMHG
BH CV ECHO MEAS - RV V1 MAX: 53.2 CM/SEC
BH CV ECHO MEAS - RV V1 VTI: 11.1 CM
BH CV ECHO MEAS - RVSP: 15 MMHG
BH CV ECHO MEAS - SV(LVOT): 110.4 ML
BH CV ECHO MEAS - SV(MOD-SP2): 114.3 ML
BH CV ECHO MEAS - SV(MOD-SP4): 113.8 ML
BH CV ECHO MEAS - SVI(LVOT): 53 ML/M2
BH CV ECHO MEAS - SVI(MOD-SP2): 54.9 ML/M2
BH CV ECHO MEAS - SVI(MOD-SP4): 54.6 ML/M2
BH CV ECHO MEAS - TAPSE (>1.6): 1.68 CM
BH CV ECHO MEAS - TR MAX PG: 12 MMHG
BH CV ECHO MEAS - TR MAX VEL: 173 CM/SEC
BH CV ECHO MEASUREMENTS AVERAGE E/E' RATIO: 11.44
BH CV XLRA - RV BASE: 3.5 CM
BH CV XLRA - RV MID: 2.7 CM
BH CV XLRA - TDI S': 7.3 CM/SEC
BURR CELLS BLD QL SMEAR: ABNORMAL
C PNEUM DNA NPH QL NAA+NON-PROBE: NOT DETECTED
CHOLEST SERPL-MCNC: 123 MG/DL (ref 0–200)
DEPRECATED RDW RBC AUTO: 44.6 FL (ref 37–54)
ERYTHROCYTE [DISTWIDTH] IN BLOOD BY AUTOMATED COUNT: 13.1 % (ref 12.3–15.4)
FLUAV H1 2009 PAND RNA NPH QL NAA+PROBE: DETECTED
FLUBV RNA ISLT QL NAA+PROBE: NOT DETECTED
GEN 5 1HR TROPONIN T REFLEX: 27 NG/L
GLUCOSE BLDC GLUCOMTR-MCNC: 101 MG/DL (ref 70–105)
GLUCOSE BLDC GLUCOMTR-MCNC: 150 MG/DL (ref 70–105)
GLUCOSE BLDC GLUCOMTR-MCNC: 151 MG/DL (ref 70–105)
GLUCOSE BLDC GLUCOMTR-MCNC: 224 MG/DL (ref 70–105)
HADV DNA SPEC NAA+PROBE: NOT DETECTED
HCOV 229E RNA SPEC QL NAA+PROBE: NOT DETECTED
HCOV HKU1 RNA SPEC QL NAA+PROBE: NOT DETECTED
HCOV NL63 RNA SPEC QL NAA+PROBE: NOT DETECTED
HCOV OC43 RNA SPEC QL NAA+PROBE: NOT DETECTED
HCT VFR BLD AUTO: 32.9 % (ref 37.5–51)
HDLC SERPL-MCNC: 45 MG/DL (ref 40–60)
HGB BLD-MCNC: 10.8 G/DL (ref 13–17.7)
HMPV RNA NPH QL NAA+NON-PROBE: NOT DETECTED
HPIV1 RNA ISLT QL NAA+PROBE: NOT DETECTED
HPIV2 RNA SPEC QL NAA+PROBE: NOT DETECTED
HPIV3 RNA NPH QL NAA+PROBE: NOT DETECTED
HPIV4 P GENE NPH QL NAA+PROBE: NOT DETECTED
INR PPP: 1.1 (ref 0.9–1.1)
LARGE PLATELETS: ABNORMAL
LDLC SERPL CALC-MCNC: 61 MG/DL (ref 0–100)
LDLC/HDLC SERPL: 1.33 {RATIO}
LEFT ATRIUM VOLUME INDEX: 32.3 ML/M2
LV EF BIPLANE MOD: 70.7 %
LYMPHOCYTES # BLD MANUAL: 0.1 10*3/MM3 (ref 0.7–3.1)
LYMPHOCYTES NFR BLD MANUAL: 5 % (ref 5–12)
M PNEUMO IGG SER IA-ACNC: NOT DETECTED
MCH RBC QN AUTO: 30.4 PG (ref 26.6–33)
MCHC RBC AUTO-ENTMCNC: 32.8 G/DL (ref 31.5–35.7)
MCV RBC AUTO: 92.7 FL (ref 79–97)
MONOCYTES # BLD: 0.25 10*3/MM3 (ref 0.1–0.9)
NEUTROPHILS # BLD AUTO: 4.55 10*3/MM3 (ref 1.7–7)
NEUTROPHILS NFR BLD MANUAL: 79 % (ref 42.7–76)
NEUTS BAND NFR BLD MANUAL: 13 % (ref 0–5)
NT-PROBNP SERPL-MCNC: 2506 PG/ML (ref 0–1800)
OVALOCYTES BLD QL SMEAR: ABNORMAL
PLATELET # BLD AUTO: 148 10*3/MM3 (ref 140–450)
PMV BLD AUTO: 11.9 FL (ref 6–12)
POIKILOCYTOSIS BLD QL SMEAR: ABNORMAL
PROTHROMBIN TIME: 14.3 SECONDS (ref 11.7–14.2)
RBC # BLD AUTO: 3.55 10*6/MM3 (ref 4.14–5.8)
RHINOVIRUS RNA SPEC NAA+PROBE: NOT DETECTED
RSV RNA NPH QL NAA+NON-PROBE: NOT DETECTED
SARS-COV-2 RNA RESP QL NAA+PROBE: NOT DETECTED
SINUS: 3.8 CM
SMALL PLATELETS BLD QL SMEAR: ADEQUATE
STJ: 2.8 CM
TRIGL SERPL-MCNC: 90 MG/DL (ref 0–150)
TROPONIN T % DELTA: 8
TROPONIN T NUMERIC DELTA: 2 NG/L
VARIANT LYMPHS NFR BLD MANUAL: 2 % (ref 19.6–45.3)
VLDLC SERPL-MCNC: 17 MG/DL (ref 5–40)
WBC MORPH BLD: NORMAL
WBC NRBC COR # BLD AUTO: 4.95 10*3/MM3 (ref 3.4–10.8)

## 2025-01-30 PROCEDURE — 71250 CT THORAX DX C-: CPT

## 2025-01-30 PROCEDURE — 25010000002 HYDROMORPHONE PER 4 MG: Performed by: PHYSICIAN ASSISTANT

## 2025-01-30 PROCEDURE — 84484 ASSAY OF TROPONIN QUANT: CPT | Performed by: PHYSICIAN ASSISTANT

## 2025-01-30 PROCEDURE — 94799 UNLISTED PULMONARY SVC/PX: CPT

## 2025-01-30 PROCEDURE — 94761 N-INVAS EAR/PLS OXIMETRY MLT: CPT

## 2025-01-30 PROCEDURE — 25010000002 NITROGLYCERIN 200 MCG/ML SOLUTION: Performed by: PHYSICIAN ASSISTANT

## 2025-01-30 PROCEDURE — 85730 THROMBOPLASTIN TIME PARTIAL: CPT | Performed by: INTERNAL MEDICINE

## 2025-01-30 PROCEDURE — 25010000002 HEPARIN (PORCINE) 25000-0.45 UT/250ML-% SOLUTION: Performed by: INTERNAL MEDICINE

## 2025-01-30 PROCEDURE — 85610 PROTHROMBIN TIME: CPT | Performed by: INTERNAL MEDICINE

## 2025-01-30 PROCEDURE — 25810000003 SODIUM CHLORIDE 0.9 % SOLUTION: Performed by: PHYSICIAN ASSISTANT

## 2025-01-30 PROCEDURE — 80061 LIPID PANEL: CPT | Performed by: PHYSICIAN ASSISTANT

## 2025-01-30 PROCEDURE — 25010000002 FUROSEMIDE PER 20 MG: Performed by: PHYSICIAN ASSISTANT

## 2025-01-30 PROCEDURE — 93306 TTE W/DOPPLER COMPLETE: CPT | Performed by: INTERNAL MEDICINE

## 2025-01-30 PROCEDURE — 93306 TTE W/DOPPLER COMPLETE: CPT

## 2025-01-30 PROCEDURE — 25010000002 HYDRALAZINE PER 20 MG: Performed by: STUDENT IN AN ORGANIZED HEALTH CARE EDUCATION/TRAINING PROGRAM

## 2025-01-30 PROCEDURE — 94640 AIRWAY INHALATION TREATMENT: CPT

## 2025-01-30 PROCEDURE — 25010000002 ENOXAPARIN PER 10 MG: Performed by: PHYSICIAN ASSISTANT

## 2025-01-30 PROCEDURE — 63710000001 INSULIN LISPRO (HUMAN) PER 5 UNITS: Performed by: HOSPITALIST

## 2025-01-30 PROCEDURE — 25010000002 SULFUR HEXAFLUORIDE MICROSPH 60.7-25 MG RECONSTITUTED SUSPENSION: Performed by: HOSPITALIST

## 2025-01-30 PROCEDURE — 83880 ASSAY OF NATRIURETIC PEPTIDE: CPT | Performed by: PHYSICIAN ASSISTANT

## 2025-01-30 PROCEDURE — 94664 DEMO&/EVAL PT USE INHALER: CPT

## 2025-01-30 PROCEDURE — 99222 1ST HOSP IP/OBS MODERATE 55: CPT | Performed by: INTERNAL MEDICINE

## 2025-01-30 PROCEDURE — 25010000002 METHYLPREDNISOLONE PER 40 MG: Performed by: HOSPITALIST

## 2025-01-30 PROCEDURE — 82948 REAGENT STRIP/BLOOD GLUCOSE: CPT

## 2025-01-30 RX ORDER — IBUPROFEN 600 MG/1
1 TABLET ORAL
Status: DISCONTINUED | OUTPATIENT
Start: 2025-01-30 | End: 2025-01-30

## 2025-01-30 RX ORDER — ATORVASTATIN CALCIUM 10 MG/1
10 TABLET, FILM COATED ORAL DAILY
Status: DISCONTINUED | OUTPATIENT
Start: 2025-01-30 | End: 2025-02-01 | Stop reason: HOSPADM

## 2025-01-30 RX ORDER — CLOPIDOGREL BISULFATE 75 MG/1
75 TABLET ORAL DAILY
Status: DISCONTINUED | OUTPATIENT
Start: 2025-01-30 | End: 2025-02-01 | Stop reason: HOSPADM

## 2025-01-30 RX ORDER — ASPIRIN 81 MG/1
81 TABLET ORAL DAILY
Status: DISCONTINUED | OUTPATIENT
Start: 2025-01-31 | End: 2025-02-01 | Stop reason: HOSPADM

## 2025-01-30 RX ORDER — OSELTAMIVIR PHOSPHATE 75 MG/1
75 CAPSULE ORAL ONCE
Status: COMPLETED | OUTPATIENT
Start: 2025-01-30 | End: 2025-01-30

## 2025-01-30 RX ORDER — DEXTROSE MONOHYDRATE 25 G/50ML
25 INJECTION, SOLUTION INTRAVENOUS
Status: DISCONTINUED | OUTPATIENT
Start: 2025-01-30 | End: 2025-02-01 | Stop reason: HOSPADM

## 2025-01-30 RX ORDER — FUROSEMIDE 10 MG/ML
40 INJECTION INTRAMUSCULAR; INTRAVENOUS ONCE
Status: COMPLETED | OUTPATIENT
Start: 2025-01-30 | End: 2025-01-30

## 2025-01-30 RX ORDER — ISOSORBIDE MONONITRATE 30 MG/1
60 TABLET, EXTENDED RELEASE ORAL
Status: DISCONTINUED | OUTPATIENT
Start: 2025-01-30 | End: 2025-01-31

## 2025-01-30 RX ORDER — OSELTAMIVIR PHOSPHATE 30 MG/1
30 CAPSULE ORAL EVERY 12 HOURS SCHEDULED
Status: DISCONTINUED | OUTPATIENT
Start: 2025-01-30 | End: 2025-02-01 | Stop reason: HOSPADM

## 2025-01-30 RX ORDER — IPRATROPIUM BROMIDE AND ALBUTEROL SULFATE 2.5; .5 MG/3ML; MG/3ML
3 SOLUTION RESPIRATORY (INHALATION)
Status: DISCONTINUED | OUTPATIENT
Start: 2025-01-30 | End: 2025-02-01 | Stop reason: HOSPADM

## 2025-01-30 RX ORDER — HEPARIN SODIUM 10000 [USP'U]/100ML
10.5 INJECTION, SOLUTION INTRAVENOUS
Status: DISCONTINUED | OUTPATIENT
Start: 2025-01-30 | End: 2025-01-31

## 2025-01-30 RX ORDER — HYDRALAZINE HYDROCHLORIDE 20 MG/ML
10 INJECTION INTRAMUSCULAR; INTRAVENOUS ONCE
Status: COMPLETED | OUTPATIENT
Start: 2025-01-30 | End: 2025-01-30

## 2025-01-30 RX ORDER — ENOXAPARIN SODIUM 100 MG/ML
1 INJECTION SUBCUTANEOUS EVERY 12 HOURS
Status: DISCONTINUED | OUTPATIENT
Start: 2025-01-30 | End: 2025-01-30

## 2025-01-30 RX ORDER — INSULIN LISPRO 100 [IU]/ML
1-200 INJECTION, SOLUTION INTRAVENOUS; SUBCUTANEOUS AS NEEDED
Status: DISCONTINUED | OUTPATIENT
Start: 2025-01-30 | End: 2025-01-30

## 2025-01-30 RX ORDER — ATENOLOL 50 MG/1
50 TABLET ORAL DAILY
Status: DISCONTINUED | OUTPATIENT
Start: 2025-01-30 | End: 2025-02-01 | Stop reason: HOSPADM

## 2025-01-30 RX ORDER — IBUPROFEN 600 MG/1
1 TABLET ORAL
Status: DISCONTINUED | OUTPATIENT
Start: 2025-01-30 | End: 2025-02-01 | Stop reason: HOSPADM

## 2025-01-30 RX ORDER — ASPIRIN 325 MG
325 TABLET ORAL DAILY
Status: DISCONTINUED | OUTPATIENT
Start: 2025-01-30 | End: 2025-01-30

## 2025-01-30 RX ORDER — METHYLPREDNISOLONE SODIUM SUCCINATE 40 MG/ML
40 INJECTION, POWDER, LYOPHILIZED, FOR SOLUTION INTRAMUSCULAR; INTRAVENOUS EVERY 8 HOURS
Status: DISCONTINUED | OUTPATIENT
Start: 2025-01-30 | End: 2025-01-31

## 2025-01-30 RX ORDER — PANTOPRAZOLE SODIUM 40 MG/1
40 TABLET, DELAYED RELEASE ORAL
Status: DISCONTINUED | OUTPATIENT
Start: 2025-01-30 | End: 2025-02-01 | Stop reason: HOSPADM

## 2025-01-30 RX ORDER — INSULIN LISPRO 100 [IU]/ML
2-7 INJECTION, SOLUTION INTRAVENOUS; SUBCUTANEOUS
Status: DISCONTINUED | OUTPATIENT
Start: 2025-01-30 | End: 2025-02-01 | Stop reason: HOSPADM

## 2025-01-30 RX ORDER — INSULIN LISPRO 100 [IU]/ML
1-200 INJECTION, SOLUTION INTRAVENOUS; SUBCUTANEOUS
Status: DISCONTINUED | OUTPATIENT
Start: 2025-01-30 | End: 2025-01-30

## 2025-01-30 RX ORDER — NICOTINE POLACRILEX 4 MG
15 LOZENGE BUCCAL
Status: DISCONTINUED | OUTPATIENT
Start: 2025-01-30 | End: 2025-01-30

## 2025-01-30 RX ORDER — NICOTINE POLACRILEX 4 MG
15 LOZENGE BUCCAL
Status: DISCONTINUED | OUTPATIENT
Start: 2025-01-30 | End: 2025-02-01 | Stop reason: HOSPADM

## 2025-01-30 RX ORDER — DEXTROSE MONOHYDRATE 25 G/50ML
10-50 INJECTION, SOLUTION INTRAVENOUS
Status: DISCONTINUED | OUTPATIENT
Start: 2025-01-30 | End: 2025-01-30

## 2025-01-30 RX ORDER — CLONIDINE HYDROCHLORIDE 0.1 MG/1
0.2 TABLET ORAL ONCE
Status: COMPLETED | OUTPATIENT
Start: 2025-01-31 | End: 2025-01-30

## 2025-01-30 RX ADMIN — MUPIROCIN 1 APPLICATION: 20 OINTMENT TOPICAL at 20:17

## 2025-01-30 RX ADMIN — ACETAMINOPHEN 650 MG: 325 TABLET, FILM COATED ORAL at 00:32

## 2025-01-30 RX ADMIN — SULFUR HEXAFLUORIDE 2 ML: KIT at 11:48

## 2025-01-30 RX ADMIN — HYDROMORPHONE HYDROCHLORIDE 0.5 MG: 1 INJECTION, SOLUTION INTRAMUSCULAR; INTRAVENOUS; SUBCUTANEOUS at 06:33

## 2025-01-30 RX ADMIN — IPRATROPIUM BROMIDE AND ALBUTEROL SULFATE 3 ML: .5; 3 SOLUTION RESPIRATORY (INHALATION) at 18:30

## 2025-01-30 RX ADMIN — SODIUM CHLORIDE 500 ML: 900 INJECTION, SOLUTION INTRAVENOUS at 00:36

## 2025-01-30 RX ADMIN — HYDROMORPHONE HYDROCHLORIDE 0.5 MG: 1 INJECTION, SOLUTION INTRAMUSCULAR; INTRAVENOUS; SUBCUTANEOUS at 22:43

## 2025-01-30 RX ADMIN — OSELTAMIVIR PHOSPHATE 75 MG: 75 CAPSULE ORAL at 05:22

## 2025-01-30 RX ADMIN — ASPIRIN 325 MG ORAL TABLET 325 MG: 325 PILL ORAL at 09:26

## 2025-01-30 RX ADMIN — HEPARIN SODIUM 10.5 UNITS/KG/HR: 10000 INJECTION, SOLUTION INTRAVENOUS at 12:51

## 2025-01-30 RX ADMIN — INSULIN LISPRO 4 UNITS: 100 INJECTION, SOLUTION INTRAVENOUS; SUBCUTANEOUS at 17:47

## 2025-01-30 RX ADMIN — HYDROMORPHONE HYDROCHLORIDE 0.5 MG: 1 INJECTION, SOLUTION INTRAMUSCULAR; INTRAVENOUS; SUBCUTANEOUS at 00:32

## 2025-01-30 RX ADMIN — CLOPIDOGREL BISULFATE 75 MG: 75 TABLET ORAL at 09:27

## 2025-01-30 RX ADMIN — ATENOLOL 50 MG: 50 TABLET ORAL at 09:27

## 2025-01-30 RX ADMIN — OSELTAMIVIR PHOSPHATE 30 MG: 30 CAPSULE ORAL at 17:47

## 2025-01-30 RX ADMIN — CLONIDINE HYDROCHLORIDE 0.2 MG: 0.1 TABLET ORAL at 23:36

## 2025-01-30 RX ADMIN — ATORVASTATIN CALCIUM 10 MG: 10 TABLET ORAL at 09:27

## 2025-01-30 RX ADMIN — NITROGLYCERIN 100 MCG/MIN: 20 INJECTION INTRAVENOUS at 06:24

## 2025-01-30 RX ADMIN — Medication 10 ML: at 21:34

## 2025-01-30 RX ADMIN — METHYLPREDNISOLONE SODIUM SUCCINATE 40 MG: 40 INJECTION, POWDER, FOR SOLUTION INTRAMUSCULAR; INTRAVENOUS at 09:27

## 2025-01-30 RX ADMIN — METHYLPREDNISOLONE SODIUM SUCCINATE 40 MG: 40 INJECTION, POWDER, FOR SOLUTION INTRAMUSCULAR; INTRAVENOUS at 17:47

## 2025-01-30 RX ADMIN — IPRATROPIUM BROMIDE AND ALBUTEROL SULFATE 3 ML: .5; 3 SOLUTION RESPIRATORY (INHALATION) at 11:05

## 2025-01-30 RX ADMIN — INSULIN LISPRO 2 UNITS: 100 INJECTION, SOLUTION INTRAVENOUS; SUBCUTANEOUS at 22:12

## 2025-01-30 RX ADMIN — HYDRALAZINE HYDROCHLORIDE 10 MG: 20 INJECTION INTRAMUSCULAR; INTRAVENOUS at 22:40

## 2025-01-30 RX ADMIN — FUROSEMIDE 40 MG: 10 INJECTION, SOLUTION INTRAMUSCULAR; INTRAVENOUS at 06:23

## 2025-01-30 RX ADMIN — MUPIROCIN 1 APPLICATION: 20 OINTMENT TOPICAL at 09:28

## 2025-01-30 RX ADMIN — ACETAMINOPHEN 650 MG: 325 TABLET, FILM COATED ORAL at 20:17

## 2025-01-30 RX ADMIN — PANTOPRAZOLE SODIUM 40 MG: 40 TABLET, DELAYED RELEASE ORAL at 05:22

## 2025-01-30 RX ADMIN — Medication 10 ML: at 09:32

## 2025-01-30 RX ADMIN — ENOXAPARIN SODIUM 100 MG: 100 INJECTION SUBCUTANEOUS at 05:21

## 2025-01-30 NOTE — PROGRESS NOTES
Parker Guerrier is a 79 y.o. male admitted with chest pain.     Recent Labs     01/29/25  2329   CREATININE 1.49*   BUN 27*      K 5.0      CO2 23.7     CrCl cannot be calculated (Unknown ideal weight.).    Assessment/Plan  Oseltamivir renally adjusted to 75mg PO once followed by 30mg PO BID per the Adult Renal Dosing of Medication policy for estCrCl 30-59 mL/min.    estCrCl ~54mL/min using previous height of 68.5 inches.     Praveena Navarro, PharmD  1/30/2025

## 2025-01-30 NOTE — PAYOR COMM NOTE
"This is a clinical update for Parker Guerrier   Reference/Auth # [id number]    INPATIENT AUTHORIZATION PENDING:     Please call or fax determination to contact below.   Thank you.    Florecita Sumner RN, BSN  Utilization Review Nurse  [facility]  Direct & confidential phone # [phone]  Fax # [phone]      Parker Guerrier (79 y.o. Male)       Date of Birth   1945    Social Security Number       Address   [address] [place] [address] [address] [address]    Home Phone   [phone]    MRN   [id number]       [employer]   Unknown    Marital Status                               Admission Date   1/29/25    Admission Type   Urgent    Admitting Provider   Darci Izquierdo MD    Attending Provider   Brammell, Timothy Duane, MD    Department, Room/Bed   [facility] CARDIOVASCULAR CARE UNIT, 3103/1       Discharge Date       Discharge Disposition       Discharge Destination                                 Attending Provider: Brammell, Timothy Duane, MD    Allergies: Morphine    Isolation: Droplet   Infection: Influenza (01/30/25)   Code Status: No CPR    Ht: 174 cm (68.5\")   Wt: 94.8 kg (209 lb)    Admission Cmt: None   Principal Problem: Chest pain [R07.9]                   Active Insurance as of 1/29/2025       Primary Coverage       Payor Plan Insurance Group Employer/Plan Group    MISC MEDICARE REPLACEMENT MISC MCARE REPLACEMENT PPO [id number]       Coverage Address Coverage Phone Number Coverage Fax Number Effective Dates    [address] [phone]  1/1/2024 - None Entered    [place] [id number]         Subscriber Name Subscriber Birth Date Member ID       PARKER GUERRIER 1945 [id number]                     Emergency Contacts        (Rel.) Home Phone Work Phone Mobile Phone    Ivy Reilly (Daughter) -- -- [phone]    Jessica Guerrier (Spouse) [phone] -- --    Adrienne Charles (Daughter) -- -- [phone]                 History & Physical        Alber Nagel, " PA at 25 2308       Attestation signed by Darci Izquierdo MD at 25 0421    I have reviewed and read this document and attest and agree with the information within this document                    [facility] Medicine Services  History & Physical    Patient Name: Parker Guerrier  : 1945  MRN: [id number]  Primary Care Physician:  Beth Young  Date of admission: 2025  Date and Time of Service: 2025 at 2300    Subjective      Chief Complaint: chest pain    History of Present Illness: Parker Guerrier is a 79 y.o. male with a CMH of comes in complaining of CAD status post CABG, diabetes mellitus, COPD, history of atrial flutter, who presented to [facility] on 2025 with comes in complaining of chest pain since this morning.  Patient reports a mild nonproductive cough since this morning as well.  Patient states the pain is throughout center of her chest that is nonradiating and nonexertional.  Patient did report some nausea but no vomiting.  Patient states spouse recently was diagnosed with pneumonia.  Patient denies any fever chills, vomiting or diarrhea.  Patient denies any bilateral leg swelling.      In the ED  at [facility], CBC showed hemoglobin of 12.3, platelets of 149, CMP showed serum creatinine of 1.5, GFR of 46, no previous to compare.  Initial high-sensitivity troponin was negative at 8.  CT PE study was done given elevated D-dimer and no evidence of PE or acute chest finding.  Cholelithiasis seen.  EKG showed normal sinus rhythm 88 bpm with lateral T wave inversions, that was new when compared to the initial EKG done in the ER.  RBBB present.  Influenza A positive.  Patient was afebrile, pulse in the 80s, on room air oxygen 96% SpO2 and blood pressure normotensive.  Patient was started on nitro drip, cardiology was consulted and agreed with therapeutic dose Lovenox at this time.      Review of Systems as per HPI    Personal History     Past Medical History:    Diagnosis Date    COPD (chronic obstructive pulmonary disease)     Coronary artery disease     Diabetes mellitus     Hx of atrial flutter     Hyperlipidemia     Hypertension     DION (obstructive sleep apnea)      no cpap       Past Surgical History:   Procedure Laterality Date    ABDOMINAL AORTIC ANEURYSM REPAIR  05/2020    CARDIAC CATHETERIZATION      CARDIAC CATHETERIZATION Right 12/27/2021    Procedure: Left Heart Cath;  Surgeon: Emily Rivera MD;  Location: [facility] CATH INVASIVE LOCATION;  Service: Cardiovascular;  Laterality: Right;    CORONARY ARTERY BYPASS GRAFT  12/1992    x 3    HERNIA REPAIR         Family History: family history is not on file. Otherwise pertinent FHx was reviewed and not pertinent to current issue.    Social History:  reports that he has been smoking cigarettes. He has never used smokeless tobacco. He reports that he does not currently use alcohol. He reports that he does not use drugs.    Home Medications:  Prior to Admission Medications       Prescriptions Last Dose Informant Patient Reported? Taking?    albuterol (PROVENTIL) (2.5 MG/3ML) 0.083% nebulizer solution   Yes No    Take 2.5 mg by nebulization Every 4 (Four) Hours As Needed for Wheezing.    aspirin 325 MG tablet  Self Yes No    Take 1 tablet by mouth Daily.    atenolol (TENORMIN) 50 MG tablet   No No    Take 1 tablet by mouth once daily    clopidogrel (PLAVIX) 75 MG tablet   No No    Take 1 tablet by mouth once daily    ezetimibe (ZETIA) 10 MG tablet   No No    Take 1 tablet by mouth Daily.    isosorbide mononitrate (IMDUR) 60 MG 24 hr tablet   No No    Take 1 tablet by mouth twice daily    metFORMIN (GLUCOPHAGE) 500 MG tablet   Yes No    Take 500 mg by mouth 2 (Two) Times a Day With Meals.    nitroglycerin (Nitrostat) 0.4 MG SL tablet   No No    Place 1 tablet under the tongue Every 5 (Five) Minutes As Needed for Chest Pain.    omeprazole (priLOSEC) 20 MG capsule   Yes No    Take 20 mg by mouth Daily.     pravastatin (PRAVACHOL) 40 MG tablet   No No    Take 1 tablet by mouth once daily    triamcinolone (KENALOG) 0.5 % ointment   Yes No    Apply 1 application topically to the appropriate area as directed 2 (Two) Times a Day As Needed for Rash (elbows).              Allergies:  Allergies   Allergen Reactions    Morphine Other (See Comments)       Objective      Vitals:   Temp:  [98.3 °F (36.8 °C)] 98.3 °F (36.8 °C)  Heart Rate:  [79] 79  BP: (122)/(68) 122/68  Body mass index is 31.45 kg/m².  Physical Exam  Vitals and nursing note reviewed.   Constitutional:       General: He is not in acute distress.     Appearance: Normal appearance. He is well-developed. He is not diaphoretic.   HENT:      Head: Normocephalic and atraumatic.      Right Ear: External ear normal.      Left Ear: External ear normal.      Nose: Nose normal.      Mouth/Throat:      Mouth: Mucous membranes are moist.   Eyes:      Extraocular Movements: Extraocular movements intact.      Conjunctiva/sclera: Conjunctivae normal.      Pupils: Pupils are equal, round, and reactive to light.   Cardiovascular:      Rate and Rhythm: Normal rate and regular rhythm.      Pulses: Normal pulses.      Heart sounds: Normal heart sounds.      Comments: S1, S2 audible.  Pulmonary:      Effort: Pulmonary effort is normal. No respiratory distress.      Breath sounds: Normal breath sounds. No wheezing, rhonchi or rales.      Comments: On room air.  Abdominal:      General: Bowel sounds are normal. There is no distension.      Palpations: Abdomen is soft.      Tenderness: There is no abdominal tenderness. There is no guarding or rebound.   Musculoskeletal:         General: No tenderness or deformity. Normal range of motion.      Cervical back: Normal range of motion.   Skin:     General: Skin is warm.      Capillary Refill: Capillary refill takes less than 2 seconds.      Findings: No erythema or rash.   Neurological:      Mental Status: He is alert and oriented to person,  place, and time.      Cranial Nerves: No cranial nerve deficit.   Psychiatric:         Mood and Affect: Mood normal.         Behavior: Behavior normal.         Diagnostic Data:  Lab Results (last 24 hours)       Procedure Component Value Units Date/Time    High Sensitivity Troponin T 1Hr [[id number]]  (Abnormal) Collected: 01/30/25 0043    Specimen: Blood Updated: 01/30/25 0118     HS Troponin T 27 ng/L      Troponin T Numeric Delta 2 ng/L      Troponin T % Delta 8    Narrative:      High Sensitive Troponin T Reference Range:  <14.0 ng/L- Negative Female for AMI  <22.0 ng/L- Negative Male for AMI  >=14 - Abnormal Female indicating possible myocardial injury.  >=22 - Abnormal Male indicating possible myocardial injury.   Clinicians would have to utilize clinical acumen, EKG, Troponin, and serial changes to determine if it is an Acute Myocardial Infarction or myocardial injury due to an underlying chronic condition.         Respiratory Panel PCR w/COVID-19(SARS-CoV-2) RITA/MARISA/BONG/PAD/COR/DIANE In-House, NP Swab in UTM/VTM, 2 HR TAT - Swab, Nasopharynx [[id number]]  (Abnormal) Collected: 01/29/25 2334    Specimen: Swab from Nasopharynx Updated: 01/30/25 0027     ADENOVIRUS, PCR Not Detected     Coronavirus 229E Not Detected     Coronavirus HKU1 Not Detected     Coronavirus NL63 Not Detected     Coronavirus OC43 Not Detected     COVID19 Not Detected     Human Metapneumovirus Not Detected     Human Rhinovirus/Enterovirus Not Detected     Influenza A H1 2009 PCR Detected     Influenza B PCR Not Detected     Parainfluenza Virus 1 Not Detected     Parainfluenza Virus 2 Not Detected     Parainfluenza Virus 3 Not Detected     Parainfluenza Virus 4 Not Detected     RSV, PCR Not Detected     Bordetella pertussis pcr Not Detected     Bordetella parapertussis PCR Not Detected     Chlamydophila pneumoniae PCR Not Detected     Mycoplasma pneumo by PCR Not Detected    Narrative:      In the setting of a positive respiratory panel with a  viral infection PLUS a negative procalcitonin without other underlying concern for bacterial infection, consider observing off antibiotics or discontinuation of antibiotics and continue supportive care. If the respiratory panel is positive for atypical bacterial infection (Bordetella pertussis, Chlamydophila pneumoniae, or Mycoplasma pneumoniae), consider antibiotic de-escalation to target atypical bacterial infection.    CBC & Differential [[id number]]  (Abnormal) Collected: 01/29/25 2329    Specimen: Blood Updated: 01/30/25 0005    Narrative:      The following orders were created for panel order CBC & Differential.  Procedure                               Abnormality         Status                     ---------                               -----------         ------                     Manual Differential[[id number]]          Abnormal            Final result               CBC Auto Differential[[id number]]                                                         Please view results for these tests on the individual orders.    CBC & Differential [[id number]]  (Abnormal) Collected: 01/29/25 2329    Specimen: Blood Updated: 01/30/25 0005    Narrative:      The following orders were created for panel order CBC & Differential.  Procedure                               Abnormality         Status                     ---------                               -----------         ------                     CBC Auto Differential[[id number]]        Abnormal            Final result                 Please view results for these tests on the individual orders.    Manual Differential [[id number]]  (Abnormal) Collected: 01/29/25 2329    Specimen: Blood Updated: 01/30/25 0005     Neutrophil % 79.0 %      Lymphocyte % 2.0 %      Monocyte % 5.0 %      Basophil % 1.0 %      Bands %  13.0 %      Neutrophils Absolute 4.55 10*3/mm3      Lymphocytes Absolute 0.10 10*3/mm3      Monocytes Absolute 0.25 10*3/mm3      Basophils Absolute 0.05 10*3/mm3       Crenated RBC's Slight/1+     Ovalocytes Slight/1+     Poikilocytes Slight/1+     WBC Morphology Normal     Platelet Estimate Adequate     Large Platelets Slight/1+    CBC Auto Differential [[id number]]  (Abnormal) Collected: 01/29/25 2329    Specimen: Blood Updated: 01/30/25 0005     WBC 4.95 10*3/mm3      RBC 3.55 10*6/mm3      Hemoglobin 10.8 g/dL      Hematocrit 32.9 %      MCV 92.7 fL      MCH 30.4 pg      MCHC 32.8 g/dL      RDW 13.1 %      RDW-SD 44.6 fl      MPV 11.9 fL      Platelets 148 10*3/mm3     Comprehensive Metabolic Panel [[id number]]  (Abnormal) Collected: 01/29/25 2329    Specimen: Blood Updated: 01/29/25 2359     Glucose 97 mg/dL      BUN 27 mg/dL      Creatinine 1.49 mg/dL      Sodium 136 mmol/L      Potassium 5.0 mmol/L      Comment: Slight hemolysis detected by analyzer. Result may be falsely elevated.        Chloride 100 mmol/L      CO2 23.7 mmol/L      Calcium 8.6 mg/dL      Total Protein 6.0 g/dL      Albumin 3.7 g/dL      ALT (SGPT) 8 U/L      AST (SGOT) 22 U/L      Alkaline Phosphatase 69 U/L      Total Bilirubin 0.2 mg/dL      Globulin 2.3 gm/dL      A/G Ratio 1.6 g/dL      BUN/Creatinine Ratio 18.1     Anion Gap 12.3 mmol/L      eGFR 47.4 mL/min/1.73     Narrative:      GFR Categories in Chronic Kidney Disease (CKD)      GFR Category          GFR (mL/min/1.73)    Interpretation  G1                     90 or greater         Normal or high (1)  G2                      60-89                Mild decrease (1)  G3a                   45-59                Mild to moderate decrease  G3b                   30-44                Moderate to severe decrease  G4                    15-29                Severe decrease  G5                    14 or less           Kidney failure          (1)In the absence of evidence of kidney disease, neither GFR category G1 or G2 fulfill the criteria for CKD.    eGFR calculation 2021 CKD-EPI creatinine equation, which does not include race as a factor    Lipase  [[id number]]  (Normal) Collected: 01/29/25 2329    Specimen: Blood Updated: 01/29/25 2356     Lipase 20 U/L     High Sensitivity Troponin T [[id number]]  (Abnormal) Collected: 01/29/25 2329    Specimen: Blood Updated: 01/29/25 2356     HS Troponin T 25 ng/L     Narrative:      High Sensitive Troponin T Reference Range:  <14.0 ng/L- Negative Female for AMI  <22.0 ng/L- Negative Male for AMI  >=14 - Abnormal Female indicating possible myocardial injury.  >=22 - Abnormal Male indicating possible myocardial injury.   Clinicians would have to utilize clinical acumen, EKG, Troponin, and serial changes to determine if it is an Acute Myocardial Infarction or myocardial injury due to an underlying chronic condition.         aPTT [[id number]]  (Abnormal) Collected: 01/29/25 2329    Specimen: Blood Updated: 01/29/25 2350     PTT 36.7 seconds     Protime-INR [[id number]]  (Abnormal) Collected: 01/29/25 2329    Specimen: Blood Updated: 01/29/25 2350     Protime 15.2 Seconds      INR 1.19             Imaging Results (Last 24 Hours)       Procedure Component Value Units Date/Time    XR Chest 1 View [[id number]] Collected: 01/29/25 2311     Updated: 01/29/25 2316    Narrative:      XR CHEST 1 VW    Date of Exam: 1/29/2025 10:55 PM EST    Indication: chest pain    Comparison: Chest radiograph 12/27/2021    Findings:  There are postoperative changes from midline sternotomy and coronary bypass grafting. The heart is enlarged. There are prominent interstitial changes throughout both lungs likely from pulmonary edema. There is a density in the right upper lobe near the   apex which is somewhat spiculated what may contain calcium evaluation with chest CT would be recommended.      Impression:      Impression:  1.Cardiomegaly with pulmonary edema.  2.Spiculated density in the right upper lobe. CT of the chest is recommended.          Electronically Signed: Lv Muniz MD    1/29/2025 11:13 PM EST    Workstation ID: [id number]               Assessment & Plan        This is a 79 y.o. male with chest pain.     Active and Resolved Problems  Active Hospital Problems    Diagnosis  POA    **Chest pain [R07.9]  Yes      Resolved Hospital Problems   No resolved problems to display.       Chest pain  Abnormal EKG  Hx of CAD, sp CABG, stents  - CBC showed hemoglobin of 12.3,   -platelets of 149,   - Initial high-sensitivity troponin was negative at 8.    -CT PE study was done given elevated D-dimer and no evidence of PE or acute chest finding.  Cholelithiasis seen.    -EKG showed normal sinus rhythm 88 bpm with lateral T wave inversions, that was new when compared to the initial EKG done in the ER.  RBBB present.   -Patient was afebrile, pulse in the 80s, on room air oxygen 96% SpO2 and blood pressure normotensive.    -Patient was started on nitro drip, cardiology was consulted and agreed with therapeutic dose Lovenox at this time.  -Cardiology consult  -Continue nitro drip  -Patient given therapeutic dose Lovenox at 1600 on 1/29/2025  -Repeat troponin, EKG, lipid panel  -Continuous cardiac monitoring  -Continue home aspirin and Plavix  -Heart healthy diet n.p.o. midnight      Influenza A infection  -Start Tamiflu    CKD III  -CMP showed serum creatinine of 1.5, GFR of 46, no previous to compare.     Diabetes mellitus type 2  -SSI, Accu-Cheks 3 times daily AC  -Check A1c  -Hold home metformin    Gerd  -continue prilosec    HLD  -conitnue statin    Essential hypertension  -Continue home atenolol        VTE Prophylaxis: lovenox  Pharmacologic & mechanical VTE prophylaxis orders are present.        The patient desires to be as follows:    CODE STATUS:    Medical Intervention Limits: No intubation (DNI)  Code Status (Patient has no pulse and is not breathing): No CPR (Do Not Attempt to Resuscitate)  Medical Interventions (Patient has pulse or is breathing): Limited Support      Admission Status:  I believe this patient meets inpatient status.    Expected  Length of Stay: 2 days    PDMP and Medication Dispenses via Sidebar reviewed and consistent with patient reported medications.    I discussed the patient's findings and my recommendations with patient.      Signature:     This document has been electronically signed by BINA Dixon on January 30, 2025 01:52 [facility] Hospitalist Team    Electronically signed by Darci Izquierdo MD at 01/30/25 0421       Emergency Department Notes    No notes of this type exist for this encounter.       Vital Signs (last 2 days)       Date/Time Temp Temp src Pulse Resp BP Patient Position SpO2    01/30/25 1108 -- -- 62 20 -- -- 95    01/30/25 1105 -- -- 63 20 -- -- 94    01/30/25 0900 -- -- 66 -- 127/68 -- 95    01/30/25 0800 -- -- 66 -- 120/66 -- 94    01/30/25 0732 99.3 (37.4) Oral 72 20 109/61 Lying 92    01/30/25 0700 -- -- 66 -- 109/61 -- 93    01/30/25 0600 -- -- 76 -- 128/70 -- 89    01/30/25 0500 -- -- 70 -- -- -- 94    01/30/25 0400 98.2 (36.8) Oral 71 -- 125/73 -- --    01/30/25 0300 -- -- 67 25 105/51 -- --    01/30/25 0100 -- -- 69 -- 113/64 -- 91    01/30/25 0000 -- -- 72 -- 114/67 -- 93    01/29/25 2300 -- -- 76 -- 113/57 -- 93    01/29/25 2233 98.3 (36.8) Oral 79 -- 122/68 Lying 95          Oxygen Therapy (last day)       Date/Time SpO2 Device (Oxygen Therapy) Flow (L/min) (Oxygen Therapy) Oxygen Concentration (%) ETCO2 (mmHg)    01/30/25 1108 95 nasal cannula 2 -- --    01/30/25 1105 94 nasal cannula 2 -- --    01/30/25 0900 95 -- -- -- --    01/30/25 0800 94 nasal cannula 5 -- --    01/30/25 0732 92 nasal cannula -- -- --    01/30/25 0700 93 -- -- -- --    01/30/25 0600 89 -- -- -- --    01/30/25 0500 94 -- -- -- --    01/30/25 0400 -- nasal cannula 2 -- --    01/30/25 0100 91 -- -- -- --    01/30/25 0000 93 -- 2 -- --    01/29/25 2300 93 -- -- -- --    01/29/25 2240 -- -- 2 -- --    01/29/25 2233 95 -- -- -- --          Facility-Administered Medications as of 1/30/2025   Medication Dose Route Frequency  Provider Last Rate Last Admin    acetaminophen (TYLENOL) tablet 650 mg  650 mg Oral Q4H PRN Alber Nagel PA   650 mg at 01/30/25 0032    aspirin tablet 325 mg  325 mg Oral Daily Alber Nagel PA   325 mg at 01/30/25 0926    atenolol (TENORMIN) tablet 50 mg  50 mg Oral Daily Alber Nagel PA   50 mg at 01/30/25 0927    atorvastatin (LIPITOR) tablet 10 mg  10 mg Oral Daily Alber Nagel PA   10 mg at 01/30/25 0927    sennosides-docusate (PERICOLACE) 8.6-50 MG per tablet 2 tablet  2 tablet Oral BID PRN Alber Nagel PA        And    polyethylene glycol (MIRALAX) packet 17 g  17 g Oral Daily PRN Alber Nagel PA        And    bisacodyl (DULCOLAX) EC tablet 5 mg  5 mg Oral Daily PRN Alber Nagel PA        And    bisacodyl (DULCOLAX) suppository 10 mg  10 mg Rectal Daily PRN Alber Nagel PA        Calcium Replacement - Follow Nurse / BPA Driven Protocol   Not Applicable PRN Alber Nagel PA        clopidogrel (PLAVIX) tablet 75 mg  75 mg Oral Daily Alber Nagel PA   75 mg at 01/30/25 0927    dextrose (D50W) (25 g/50 mL) IV injection 10-50 mL  10-50 mL Intravenous Q15 Min PRN Brammell, Timothy Duane, MD        dextrose (GLUTOSE) oral gel 15 g  15 g Oral Q15 Min PRN Brammell, Timothy Duane, MD        [COMPLETED] furosemide (LASIX) injection 40 mg  40 mg Intravenous Once Alber Nagel PA   40 mg at 01/30/25 0623    Glucagon (GLUCAGEN) injection 1 mg  1 mg Intramuscular Q15 Min PRN Brammell, Timothy Duane, MD        HYDROmorphone (DILAUDID) injection 0.5 mg  0.5 mg Intravenous Q3H PRN Alber Nagel PA   0.5 mg at 01/30/25 0633    insulin lispro (HUMALOG/ADMELOG) injection 1-200 Units  1-200 Units Subcutaneous 4x Daily With Meals & Nightly Brammell, Timothy Duane, MD        insulin lispro (HUMALOG/ADMELOG) injection 1-200 Units  1-200 Units Subcutaneous PRN Brammell, Timothy Duane, MD        ipratropium-albuterol (DUO-NEB) nebulizer solution 3 mL  3 mL Nebulization 4x Daily - RT Brammell, Timothy Duane, MD   3 mL at 01/30/25 1105    [Held by  provider] isosorbide mononitrate (IMDUR) 24 hr tablet 60 mg  60 mg Oral BID - Nitrates Alber Nagel PA        Magnesium Standard Dose Replacement - Follow Nurse / BPA Driven Protocol   Not Applicable PRN Alber Nagel PA        methylPREDNISolone sodium succinate (SOLU-Medrol) injection 40 mg  40 mg Intravenous Q8H Brammell, Timothy Duane, MD   40 mg at 01/30/25 0927    mupirocin (BACTROBAN) 2 % nasal ointment 1 Application  1 Application Each Nare BID Alber Nagel PA   1 Application at 01/30/25 0928    nitroglycerin (NITROSTAT) SL tablet 0.4 mg  0.4 mg Sublingual Q5 Min PRN Alber Nagel PA        nitroglycerin (TRIDIL) 200 mcg/ml infusion  10-50 mcg/min Intravenous Titrated Alber Nagel PA 30 mL/hr at 01/30/25 0624 100 mcg/min at 01/30/25 0624    oseltamivir (TAMIFLU) capsule 30 mg  30 mg Oral Q12H Alber Nagel PA        [COMPLETED] oseltamivir (TAMIFLU) capsule 75 mg  75 mg Oral Once Alber Nagel PA   75 mg at 01/30/25 0522    pantoprazole (PROTONIX) EC tablet 40 mg  40 mg Oral Q AM Alber Nagel PA   40 mg at 01/30/25 0522    Phosphorus Replacement - Follow Nurse / BPA Driven Protocol   Not Applicable PRAlber Barnard PA        Potassium Replacement - Follow Nurse / BPA Driven Protocol   Not Applicable PRAlber Barnard PA        [COMPLETED] sodium chloride 0.9 % bolus 500 mL  500 mL Intravenous Once Alber Nagel  mL/hr at 01/30/25 0036 500 mL at 01/30/25 0036    sodium chloride 0.9 % flush 10 mL  10 mL Intravenous Q12H Alber Nagel PA   10 mL at 01/30/25 0932    sodium chloride 0.9 % flush 10 mL  10 mL Intravenous PRN Alber Nagel PA        sodium chloride 0.9 % infusion 40 mL  40 mL Intravenous PRN Alber Nagel PA         Lab Results (last 24 hours)       Procedure Component Value Units Date/Time    POC Glucose Once [[id number]]  (Normal) Collected: 01/30/25 0917    Specimen: Blood Updated: 01/30/25 0920     Glucose 101 mg/dL      Comment: Serial Number: [id number]:  [id number]       Lipid Panel [[id number]]  Collected: 01/30/25 0531    Specimen: Blood Updated: 01/30/25 0844     Total Cholesterol 123 mg/dL      Triglycerides 90 mg/dL      HDL Cholesterol 45 mg/dL      LDL Cholesterol  61 mg/dL      VLDL Cholesterol 17 mg/dL      LDL/HDL Ratio 1.33    Narrative:      Cholesterol Reference Ranges  (U.S. Department of Health and Human Services ATP III Classifications)    Desirable          <200 mg/dL  Borderline High    200-239 mg/dL  High Risk          >240 mg/dL      Triglyceride Reference Ranges  (U.S. Department of Health and Human Services ATP III Classifications)    Normal           <150 mg/dL  Borderline High  150-199 mg/dL  High             200-499 mg/dL  Very High        >500 mg/dL    HDL Reference Ranges  (U.S[place] Department of Health and Human Services ATP III Classifications)    Low     <40 mg/dl (major risk factor for CHD)  High    >60 mg/dl ('negative' risk factor for CHD)        LDL Reference Ranges  (U.S. Department of Health and Human Services ATP III Classifications)    Optimal          <100 mg/dL  Near Optimal     100-129 mg/dL  Borderline High  130-159 mg/dL  High             160-189 mg/dL  Very High        >189 mg/dL    LDL is calculated using the NIH LDL-C calculation.      BNP [[id number]]  (Abnormal) Collected: 01/30/25 0531    Specimen: Blood Updated: 01/30/25 0725     proBNP 2,506.0 pg/mL     Narrative:      This assay is used as an aid in the diagnosis of individuals suspected of having heart failure. It can be used as an aid in the diagnosis of acute decompensated heart failure (ADHF) in patients presenting with signs and symptoms of ADHF to the emergency department (ED). In addition, NT-proBNP of <300 pg/mL indicates ADHF is not likely.    Age Range Result Interpretation  NT-proBNP Concentration (pg/mL:      <50             Positive            >450                   Gray                 300-450                    Negative             <300    50-75           Positive            >900                   [place]                300-900                  Negative            <300      >75             Positive            >1800                  Gray                300-1800                  Negative            <300    High Sensitivity Troponin T 1Hr [[id number]]  (Abnormal) Collected: 01/30/25 0043    Specimen: Blood Updated: 01/30/25 0118     HS Troponin T 27 ng/L      Troponin T Numeric Delta 2 ng/L      Troponin T % Delta 8    Narrative:      High Sensitive Troponin T Reference Range:  <14.0 ng/L- Negative Female for AMI  <22.0 ng/L- Negative Male for AMI  >=14 - Abnormal Female indicating possible myocardial injury.  >=22 - Abnormal Male indicating possible myocardial injury.   Clinicians would have to utilize clinical acumen, EKG, Troponin, and serial changes to determine if it is an Acute Myocardial Infarction or myocardial injury due to an underlying chronic condition.         Respiratory Panel PCR w/COVID-19(SARS-CoV-2) RITA/MARISA/BONG/PAD/COR/DIANE In-House, NP Swab in UTM/VTM, 2 HR TAT - Swab, Nasopharynx [[id number]]  (Abnormal) Collected: 01/29/25 2334    Specimen: Swab from Nasopharynx Updated: 01/30/25 0027     ADENOVIRUS, PCR Not Detected     Coronavirus 229E Not Detected     Coronavirus HKU1 Not Detected     Coronavirus NL63 Not Detected     Coronavirus OC43 Not Detected     COVID19 Not Detected     Human Metapneumovirus Not Detected     Human Rhinovirus/Enterovirus Not Detected     Influenza A H1 2009 PCR Detected     Influenza B PCR Not Detected     Parainfluenza Virus 1 Not Detected     Parainfluenza Virus 2 Not Detected     Parainfluenza Virus 3 Not Detected     Parainfluenza Virus 4 Not Detected     RSV, PCR Not Detected     Bordetella pertussis pcr Not Detected     Bordetella parapertussis PCR Not Detected     Chlamydophila pneumoniae PCR Not Detected     Mycoplasma pneumo by PCR Not Detected    Narrative:      In the setting of a positive respiratory panel with a viral infection PLUS a negative procalcitonin  without other underlying concern for bacterial infection, consider observing off antibiotics or discontinuation of antibiotics and continue supportive care. If the respiratory panel is positive for atypical bacterial infection (Bordetella pertussis, Chlamydophila pneumoniae, or Mycoplasma pneumoniae), consider antibiotic de-escalation to target atypical bacterial infection.    CBC & Differential [[id number]]  (Abnormal) Collected: 01/29/25 2329    Specimen: Blood Updated: 01/30/25 0005    Narrative:      The following orders were created for panel order CBC & Differential.  Procedure                               Abnormality         Status                     ---------                               -----------         ------                     Manual Differential[[id number]]          Abnormal            Final result               CBC Auto Differential[[id number]]                                                         Please view results for these tests on the individual orders.    CBC & Differential [[id number]]  (Abnormal) Collected: 01/29/25 2329    Specimen: Blood Updated: 01/30/25 0005    Narrative:      The following orders were created for panel order CBC & Differential.  Procedure                               Abnormality         Status                     ---------                               -----------         ------                     CBC Auto Differential[[id number]]        Abnormal            Final result                 Please view results for these tests on the individual orders.    Manual Differential [[id number]]  (Abnormal) Collected: 01/29/25 2329    Specimen: Blood Updated: 01/30/25 0005     Neutrophil % 79.0 %      Lymphocyte % 2.0 %      Monocyte % 5.0 %      Basophil % 1.0 %      Bands %  13.0 %      Neutrophils Absolute 4.55 10*3/mm3      Lymphocytes Absolute 0.10 10*3/mm3      Monocytes Absolute 0.25 10*3/mm3      Basophils Absolute 0.05 10*3/mm3      Crenated RBC's Slight/1+     Ovalocytes  Slight/1+     Poikilocytes Slight/1+     WBC Morphology Normal     Platelet Estimate Adequate     Large Platelets Slight/1+    CBC Auto Differential [[id number]]  (Abnormal) Collected: 01/29/25 2329    Specimen: Blood Updated: 01/30/25 0005     WBC 4.95 10*3/mm3      RBC 3.55 10*6/mm3      Hemoglobin 10.8 g/dL      Hematocrit 32.9 %      MCV 92.7 fL      MCH 30.4 pg      MCHC 32.8 g/dL      RDW 13.1 %      RDW-SD 44.6 fl      MPV 11.9 fL      Platelets 148 10*3/mm3     Comprehensive Metabolic Panel [[id number]]  (Abnormal) Collected: 01/29/25 2329    Specimen: Blood Updated: 01/29/25 2359     Glucose 97 mg/dL      BUN 27 mg/dL      Creatinine 1.49 mg/dL      Sodium 136 mmol/L      Potassium 5.0 mmol/L      Comment: Slight hemolysis detected by analyzer. Result may be falsely elevated.        Chloride 100 mmol/L      CO2 23.7 mmol/L      Calcium 8.6 mg/dL      Total Protein 6.0 g/dL      Albumin 3.7 g/dL      ALT (SGPT) 8 U/L      AST (SGOT) 22 U/L      Alkaline Phosphatase 69 U/L      Total Bilirubin 0.2 mg/dL      Globulin 2.3 gm/dL      A/G Ratio 1.6 g/dL      BUN/Creatinine Ratio 18.1     Anion Gap 12.3 mmol/L      eGFR 47.4 mL/min/1.73     Narrative:      GFR Categories in Chronic Kidney Disease (CKD)      GFR Category          GFR (mL/min/1.73)    Interpretation  G1                     90 or greater         Normal or high (1)  G2                      60-89                Mild decrease (1)  G3a                   45-59                Mild to moderate decrease  G3b                   30-44                Moderate to severe decrease  G4                    15-29                Severe decrease  G5                    14 or less           Kidney failure          (1)In the absence of evidence of kidney disease, neither GFR category G1 or G2 fulfill the criteria for CKD.    eGFR calculation 2021 CKD-EPI creatinine equation, which does not include race as a factor    Lipase [[id number]]  (Normal) Collected: 01/29/25 2329     Specimen: Blood Updated: 01/29/25 2356     Lipase 20 U/L     High Sensitivity Troponin T [[id number]]  (Abnormal) Collected: 01/29/25 2329    Specimen: Blood Updated: 01/29/25 2356     HS Troponin T 25 ng/L     Narrative:      High Sensitive Troponin T Reference Range:  <14.0 ng/L- Negative Female for AMI  <22.0 ng/L- Negative Male for AMI  >=14 - Abnormal Female indicating possible myocardial injury.  >=22 - Abnormal Male indicating possible myocardial injury.   Clinicians would have to utilize clinical acumen, EKG, Troponin, and serial changes to determine if it is an Acute Myocardial Infarction or myocardial injury due to an underlying chronic condition.         aPTT [[id number]]  (Abnormal) Collected: 01/29/25 2329    Specimen: Blood Updated: 01/29/25 2350     PTT 36.7 seconds     Protime-INR [[id number]]  (Abnormal) Collected: 01/29/25 2329    Specimen: Blood Updated: 01/29/25 2350     Protime 15.2 Seconds      INR 1.19          Imaging Results (Last 24 Hours)       Procedure Component Value Units Date/Time    CT Chest Without Contrast Diagnostic [[id number]] Collected: 01/30/25 1015     Updated: 01/30/25 1022    Narrative:      CT CHEST WO CONTRAST DIAGNOSTIC    Date of Exam: 1/30/2025 10:14 AM EST    Indication: lung mass.    Comparison: AP chest 1/29/2025    Technique: Axial CT images were obtained of the chest without contrast administration.  Sagittal and coronal reconstructions were performed.  Automated exposure control and iterative reconstruction methods were used.      Findings:  Images are mildly degraded by respiratory motion. Mild subpleural groundglass density in the anterior right upper lobe measures 1.0 x 1.5 cm (5/24).    Heart size is mildly enlarged. Dense coronary calcifications are present. CABG changes are present. No pathologic enlarged lymph nodes identified. Calcific atherosclerosis is demonstrated within the thoracic aorta. Distal transverse thoracic aorta is   aneurysmal at 4.0 cm. Mid  descending thoracic aorta is aneurysmal at 3.8 cm. Mid ascending thoracic root is aneurysmal at 4.3 cm. Juxtarenal abdominal aortic aneurysm measures 5.1 cm.    Heavy calcific atherosclerosis is seen within the aortic root.    Uncomplicated cholelithiasis. Dense renal artery calcific atherosclerosis. Small right renal cyst. Small midline upper abdominal ventral hernia containing fat, measuring 2.5 x 2.1 cm.    Degenerative changes of the spine and both shoulders. No acute or suspicious osseous abnormalities.      Impression:      Impression:    1. 1.5 cm focal groundglass density in the subpleural right upper lobe, favored represent focal pneumonitis or mild atelectasis. No lung mass lesion is identified.  2. 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 Dimensions as above.  3. Severe coronary calcifications with signs of CABG. Correlate with known cardiac history.  4. Uncomplicated cholelithiasis.      Electronically Signed: Orly Cervantes MD    1/30/2025 10:20 AM EST    Workstation ID: [id number]    XR Chest 1 View [[id number]] Collected: 01/29/25 2311     Updated: 01/29/25 2316    Narrative:      XR CHEST 1 VW    Date of Exam: 1/29/2025 10:55 PM EST    Indication: chest pain    Comparison: Chest radiograph 12/27/2021    Findings:  There are postoperative changes from midline sternotomy and coronary bypass grafting. The heart is enlarged. There are prominent interstitial changes throughout both lungs likely from pulmonary edema. There is a density in the right upper lobe near the   apex which is somewhat spiculated what may contain calcium evaluation with chest CT would be recommended.      Impression:      Impression:  1.Cardiomegaly with pulmonary edema.  2.Spiculated density in the right upper lobe. CT of the chest is recommended.          Electronically Signed: Lv Muniz MD    1/29/2025 11:13 PM EST    Workstation ID: [id number]          Operative/Procedure Notes (all)    No notes of this type exist for  this encounter.          Physician Progress Notes (all)        Brammell, Timothy Duane, MD at 25 0848              [facility] MEDICINE SERVICE  DAILY PROGRESS NOTE    NAME: Parker Guerrier  : 1945  MRN: [id number]      LOS: 1 day     PROVIDER OF SERVICE: Timothy Duane Brammell, MD    Chief Complaint: Chest pain    Subjective:     Interval History:  History taken from: patient    Patient with entire primary issues with shortness of breath.  He had fairly sudden onset of symptoms yesterday morning.  Had some headache associated with his IV nitroglycerin.  He does not routinely uses nebulization therapy at home.  He has some chest discomfort primarily with cough.  Denies any nausea vomiting.  Denies any bowel complaints.  Denies any severe upper respiratory symptoms.  Denies any other additional acute issues.        Review of Systems:   Review of Systems   All other systems reviewed and are negative.      Objective:     Vital Signs  Temp:  [98.2 °F (36.8 °C)-99.3 °F (37.4 °C)] 99.3 °F (37.4 °C)  Heart Rate:  [67-79] 72  Resp:  [20-25] 20  BP: (105-125)/(51-73) 109/61  Flow (L/min) (Oxygen Therapy):  [2] 2   Body mass index is 31.44 kg/m².    Physical Exam  Physical Exam  Constitutional:       General: He is not in acute distress.  HENT:      Head: Normocephalic.   Cardiovascular:      Rate and Rhythm: Normal rate and regular rhythm.   Pulmonary:      Effort: No respiratory distress.      Breath sounds: Wheezing present. No rhonchi.   Abdominal:      General: Bowel sounds are normal.      Palpations: Abdomen is soft.   Musculoskeletal:         General: No swelling.   Neurological:      Mental Status: He is alert. Mental status is at baseline.            Diagnostic Data    Results from last 7 days   Lab Units 25  2329   WBC 10*3/mm3 4.95   HEMOGLOBIN g/dL 10.8*   HEMATOCRIT % 32.9*   PLATELETS 10*3/mm3 148   GLUCOSE mg/dL 97   CREATININE mg/dL 1.49*   BUN mg/dL 27*   SODIUM mmol/L 136   POTASSIUM  mmol/L 5.0   AST (SGOT) U/L 22   ALT (SGPT) U/L 8   ALK PHOS U/L 69   BILIRUBIN mg/dL 0.2   ANION GAP mmol/L 12.3       XR Chest 1 View    Result Date: 1/29/2025  Impression: 1.Cardiomegaly with pulmonary edema. 2.Spiculated density in the right upper lobe. CT of the chest is recommended. Electronically Signed: Lv uMniz MD  1/29/2025 11:13 PM EST  Workstation ID: [id number]           Assessment:    Chest pain/history of coronary artery disease/CABG  Influenza A   COPD with acute exacerbation  type 2 diabetes  Chronic kidney disease stage III  Anemia       Plan.  Chart reviewed.  CT scanning noted to be negative at outlying hospital.  Scheduled  nebulization therapy.  IV steroids.  Cardiology to review.  IV nitro infusing.  Follow-up blood sugars.      Active and Resolved Problems  Active Hospital Problems    Diagnosis  POA    **Chest pain [R07.9]  Yes      Resolved Hospital Problems   No resolved problems to display.           VTE Prophylaxis:  Mechanical VTE prophylaxis orders are present.             Disposition Planning:     Barriers to Discharge:cardiolgy clearance and resp status  Anticipated Date of Discharge: 2/2  Place of Discharge: home      Time: 45 minutes     Code Status and Medical Interventions: No CPR (Do Not Attempt to Resuscitate); Limited Support; No intubation (DNI)   Ordered at: 01/29/25 5809     Medical Intervention Limits:    No intubation (DNI)     Code Status (Patient has no pulse and is not breathing):    No CPR (Do Not Attempt to Resuscitate)     Medical Interventions (Patient has pulse or is breathing):    Limited Support       Signature: Electronically signed by Timothy Duane Brammell, MD, 01/30/25, 08:48 EST.  [facility] Hospitalist Team     Electronically signed by Brammell, Timothy Duane, MD at 01/30/25 0839       Consult Notes (all)    No notes of this type exist for this encounter.

## 2025-01-30 NOTE — PROGRESS NOTES
[facility] MEDICINE SERVICE  DAILY PROGRESS NOTE    NAME: Parker Guerrier  : 1945  MRN: [id number]      LOS: 1 day     PROVIDER OF SERVICE: Timothy Duane Brammell, MD    Chief Complaint: Chest pain    Subjective:     Interval History:  History taken from: patient    Patient with entire primary issues with shortness of breath.  He had fairly sudden onset of symptoms yesterday morning.  Had some headache associated with his IV nitroglycerin.  He does not routinely uses nebulization therapy at home.  He has some chest discomfort primarily with cough.  Denies any nausea vomiting.  Denies any bowel complaints.  Denies any severe upper respiratory symptoms.  Denies any other additional acute issues.        Review of Systems:   Review of Systems   All other systems reviewed and are negative.      Objective:     Vital Signs  Temp:  [98.2 °F (36.8 °C)-99.3 °F (37.4 °C)] 99.3 °F (37.4 °C)  Heart Rate:  [67-79] 72  Resp:  [20-25] 20  BP: (105-125)/(51-73) 109/61  Flow (L/min) (Oxygen Therapy):  [2] 2   Body mass index is 31.44 kg/m².    Physical Exam  Physical Exam  Constitutional:       General: He is not in acute distress.  HENT:      Head: Normocephalic.   Cardiovascular:      Rate and Rhythm: Normal rate and regular rhythm.   Pulmonary:      Effort: No respiratory distress.      Breath sounds: Wheezing present. No rhonchi.   Abdominal:      General: Bowel sounds are normal.      Palpations: Abdomen is soft.   Musculoskeletal:         General: No swelling.   Neurological:      Mental Status: He is alert. Mental status is at baseline.            Diagnostic Data    Results from last 7 days   Lab Units 25  2329   WBC 10*3/mm3 4.95   HEMOGLOBIN g/dL 10.8*   HEMATOCRIT % 32.9*   PLATELETS 10*3/mm3 148   GLUCOSE mg/dL 97   CREATININE mg/dL 1.49*   BUN mg/dL 27*   SODIUM mmol/L 136   POTASSIUM mmol/L 5.0   AST (SGOT) U/L 22   ALT (SGPT) U/L 8   ALK PHOS U/L 69   BILIRUBIN mg/dL 0.2   ANION GAP mmol/L 12.3        XR Chest 1 View    Result Date: 1/29/2025  Impression: 1.Cardiomegaly with pulmonary edema. 2.Spiculated density in the right upper lobe. CT of the chest is recommended. Electronically Signed: Lv Muniz MD  1/29/2025 11:13 PM EST  Workstation ID: [id number]           Assessment:    Chest pain/history of coronary artery disease/CABG  Influenza A   COPD with acute exacerbation  type 2 diabetes  Chronic kidney disease stage III  Anemia       Plan.  Chart reviewed.  CT scanning noted to be negative at outlying hospital.  Scheduled  nebulization therapy.  IV steroids.  Cardiology to review.  IV nitro infusing.  Follow-up blood sugars.      Active and Resolved Problems  Active Hospital Problems    Diagnosis  POA    **Chest pain [R07.9]  Yes      Resolved Hospital Problems   No resolved problems to display.           VTE Prophylaxis:  Mechanical VTE prophylaxis orders are present.             Disposition Planning:     Barriers to Discharge:cardiolgy clearance and resp status  Anticipated Date of Discharge: 2/2  Place of Discharge: home      Time: 45 minutes     Code Status and Medical Interventions: No CPR (Do Not Attempt to Resuscitate); Limited Support; No intubation (DNI)   Ordered at: 01/29/25 6119     Medical Intervention Limits:    No intubation (DNI)     Code Status (Patient has no pulse and is not breathing):    No CPR (Do Not Attempt to Resuscitate)     Medical Interventions (Patient has pulse or is breathing):    Limited Support       Signature: Electronically signed by Timothy Duane Brammell, MD, 01/30/25, 08:48 EST.  [facility] [place] Hospitalist Team

## 2025-01-30 NOTE — CONSULTS
Referring Provider: Dr. Jm Robins  Reason for Consultation: Chest pain    Chief complaint chest pain        Cardiology assessment and plan    Chest discomfort  Coronary artery disease  Prior coronary artery bypass surgery  Chest pain with some pleuritic component  Prior coronary artery disease prior coronary artery bypass surgery  Prior PCI  Residual significant coronary artery disease involving the circumflex artery that is not amenable for intervention  Hypertension  Hyperlipidemia  Past 2 influenza A  Chronic kidney disease  Diabetes mellitus  Tmax is 99.2 pulse is 66 respirations are 20 blood pressure is 126/68 sats are 95%  Abnormal elevated proBNP of 2500  Nonspecific elevation of troponin with no significant trend  Sodium is 136 potassium is 5.0 creatinine is 1.49 LFTs are normal INR is 1.1 hemoglobin is 10.8  Patient received 1 dose of Lovenox  Respiratory viral panel is positive for influenza  Chest pain is somewhat atypical for angina  Twelve-lead EKG shows sinus rhythm with right bundle branch block and left intrafascicular block  CT chest with no lung mass  Cardiac diagnosis and treatment options reviewed and discussed the patient  Prior medical records reviewed  Prior cath findings reviewed  Diagnosis and treatment options risk benefits and alternatives reviewed and discussed patient  Patient is advised to consider further invasive workup for further evaluation and treatment options  Patient was initially reluctant but now patient is agreeable  Plan to schedule patient for cardiac catheterization tomorrow  Schedule for cardiac catheterization tomorrow  Continue IV heparin and IV nitroglycerin  Available medical records reviewed and discussed patient and family  Further recommendation based on patient course                History of present illness:  Parker Guerrier is a 79 y.o. male who presents with past medical history that is significant for history of coronary artery disease prior coronary  artery bypass surgery history of prior PCI and stenting history of hypertension hyperlipidemia history of COPD history of multiple medical problems presented with symptoms of chest pain diagnosed with unstable angina possible non-ST-elevation and infarction and patient was hospitalized for further evaluation and treatment options  Patient is complaining of chest pain for the last 2 days on and off  Chest pain somewhat better with nitroglycerin  Patient is also positive for flu  No dizziness or syncope  No orthopnea no PND  No nausea vomiting diarrhea  No hematemesis no melena  Abdominal pain  No syncope      Review of Systems  Review of Systems   Constitutional: Negative for chills, decreased appetite and malaise/fatigue.   HENT:  Negative for congestion and nosebleeds.    Eyes:  Negative for blurred vision and double vision.   Cardiovascular:  Positive for chest pain and dyspnea on exertion. Negative for irregular heartbeat, leg swelling, near-syncope, orthopnea and palpitations.   Respiratory:  Positive for cough and shortness of breath.    Hematologic/Lymphatic: Negative for adenopathy. Does not bruise/bleed easily.   Skin:  Negative for color change and rash.   Musculoskeletal:  Negative for back pain and joint pain.   Gastrointestinal:  Negative for bloating, abdominal pain, hematemesis and hematochezia.   Genitourinary:  Negative for flank pain and hematuria.   Neurological:  Negative for dizziness and focal weakness.   Psychiatric/Behavioral:  Negative for altered mental status. The patient does not have insomnia.        Past Medical History  Past Medical History:   Diagnosis Date    COPD (chronic obstructive pulmonary disease)     Coronary artery disease     Diabetes mellitus     Hx of atrial flutter     Hyperlipidemia     Hypertension     DION (obstructive sleep apnea)      no cpap    and   Past Surgical History:   Procedure Laterality Date    ABDOMINAL AORTIC ANEURYSM REPAIR  05/2020    CARDIAC  "CATHETERIZATION      CARDIAC CATHETERIZATION Right 12/27/2021    Procedure: Left Heart Cath;  Surgeon: Emily Rivera MD;  Location: [facility] CATH INVASIVE LOCATION;  Service: Cardiovascular;  Laterality: Right;    CORONARY ARTERY BYPASS GRAFT  12/1992    x 3    HERNIA REPAIR         Family History  History reviewed. No pertinent family history.    Social History  Social History     Socioeconomic History    Marital status:    Tobacco Use    Smoking status: Every Day     Current packs/day: 0.50     Types: Cigarettes    Smokeless tobacco: Never   Vaping Use    Vaping status: Never Used   Substance and Sexual Activity    Alcohol use: Not Currently    Drug use: Never    Sexual activity: Defer       Objective     Physical Exam:  Constitutional:       Appearance: Well-developed.   Eyes:      Conjunctiva/sclera: Conjunctivae normal.      Pupils: Pupils are equal, round, and reactive to light.   HENT:      Head: Normocephalic and atraumatic.   Neck:      Thyroid: No thyromegaly.   Pulmonary:      Effort: Pulmonary effort is normal.      Breath sounds: Normal breath sounds.   Cardiovascular:      Normal rate. Regular rhythm.   Pulses:     Intact distal pulses.   Edema:     Peripheral edema absent.   Abdominal:      General: Bowel sounds are normal.      Palpations: Abdomen is soft.   Musculoskeletal:      Cervical back: Normal range of motion and neck supple. Skin:     General: Skin is warm.   Neurological:      Mental Status: Alert and oriented to person, place, and time.         Vital Signs  Vitals:    01/30/25 0100 01/30/25 0300 01/30/25 0400 01/30/25 0732   BP: 113/64 105/51 125/73 109/61   BP Location:    Right arm   Patient Position:    Lying   Pulse: 69 67 71 72   Resp:  25  20   Temp:   98.2 °F (36.8 °C) 99.3 °F (37.4 °C)   TempSrc:   Oral Oral   SpO2: 91%   92%   Weight:       Height:           Weight  Flowsheet Rows      Flowsheet Row First Filed Value   Admission Height 174 cm (68.5\") Documented at " 01/29/2025 2200   Admission Weight 95.2 kg (209 lb 14.1 oz) Documented at 01/29/2025 2200                Results Review:  Lab Results (last 24 hours)       Procedure Component Value Units Date/Time    BNP [[id number]]  (Abnormal) Collected: 01/30/25 0531    Specimen: Blood Updated: 01/30/25 0725     proBNP 2,506.0 pg/mL     Narrative:      This assay is used as an aid in the diagnosis of individuals suspected of having heart failure. It can be used as an aid in the diagnosis of acute decompensated heart failure (ADHF) in patients presenting with signs and symptoms of ADHF to the emergency department (ED). In addition, NT-proBNP of <300 pg/mL indicates ADHF is not likely.    Age Range Result Interpretation  NT-proBNP Concentration (pg/mL:      <50             Positive            >450                   Gray                 300-450                    Negative             <300    50-75           Positive            >900                  Gray                300-900                  Negative            <300      >75             Positive            >1800                  Gray                300-1800                  Negative            <300    Lipid Panel [[id number]] Collected: 01/30/25 0531    Specimen: Blood Updated: 01/30/25 0653    High Sensitivity Troponin T 1Hr [[id number]]  (Abnormal) Collected: 01/30/25 0043    Specimen: Blood Updated: 01/30/25 0118     HS Troponin T 27 ng/L      Troponin T Numeric Delta 2 ng/L      Troponin T % Delta 8    Narrative:      High Sensitive Troponin T Reference Range:  <14.0 ng/L- Negative Female for AMI  <22.0 ng/L- Negative Male for AMI  >=14 - Abnormal Female indicating possible myocardial injury.  >=22 - Abnormal Male indicating possible myocardial injury.   Clinicians would have to utilize clinical acumen, EKG, Troponin, and serial changes to determine if it is an Acute Myocardial Infarction or myocardial injury due to an underlying chronic condition.         Respiratory Panel PCR  w/COVID-19(SARS-CoV-2) RITA/MARISA/BONG/PAD/COR/DIANE In-House, NP Swab in UTM/VTM, 2 HR TAT - Swab, Nasopharynx [[id number]]  (Abnormal) Collected: 01/29/25 2334    Specimen: Swab from Nasopharynx Updated: 01/30/25 0027     ADENOVIRUS, PCR Not Detected     Coronavirus 229E Not Detected     Coronavirus HKU1 Not Detected     Coronavirus NL63 Not Detected     Coronavirus OC43 Not Detected     COVID19 Not Detected     Human Metapneumovirus Not Detected     Human Rhinovirus/Enterovirus Not Detected     Influenza A H1 2009 PCR Detected     Influenza B PCR Not Detected     Parainfluenza Virus 1 Not Detected     Parainfluenza Virus 2 Not Detected     Parainfluenza Virus 3 Not Detected     Parainfluenza Virus 4 Not Detected     RSV, PCR Not Detected     Bordetella pertussis pcr Not Detected     Bordetella parapertussis PCR Not Detected     Chlamydophila pneumoniae PCR Not Detected     Mycoplasma pneumo by PCR Not Detected    Narrative:      In the setting of a positive respiratory panel with a viral infection PLUS a negative procalcitonin without other underlying concern for bacterial infection, consider observing off antibiotics or discontinuation of antibiotics and continue supportive care. If the respiratory panel is positive for atypical bacterial infection (Bordetella pertussis, Chlamydophila pneumoniae, or Mycoplasma pneumoniae), consider antibiotic de-escalation to target atypical bacterial infection.    CBC & Differential [[id number]]  (Abnormal) Collected: 01/29/25 2329    Specimen: Blood Updated: 01/30/25 0005    Narrative:      The following orders were created for panel order CBC & Differential.  Procedure                               Abnormality         Status                     ---------                               -----------         ------                     Manual Differential[[id number]]          Abnormal            Final result               CBC Auto Differential[[id number]]                                                          Please view results for these tests on the individual orders.    CBC & Differential [[id number]]  (Abnormal) Collected: 01/29/25 2329    Specimen: Blood Updated: 01/30/25 0005    Narrative:      The following orders were created for panel order CBC & Differential.  Procedure                               Abnormality         Status                     ---------                               -----------         ------                     CBC Auto Differential[[id number]]        Abnormal            Final result                 Please view results for these tests on the individual orders.    Manual Differential [[id number]]  (Abnormal) Collected: 01/29/25 2329    Specimen: Blood Updated: 01/30/25 0005     Neutrophil % 79.0 %      Lymphocyte % 2.0 %      Monocyte % 5.0 %      Basophil % 1.0 %      Bands %  13.0 %      Neutrophils Absolute 4.55 10*3/mm3      Lymphocytes Absolute 0.10 10*3/mm3      Monocytes Absolute 0.25 10*3/mm3      Basophils Absolute 0.05 10*3/mm3      Crenated RBC's Slight/1+     Ovalocytes Slight/1+     Poikilocytes Slight/1+     WBC Morphology Normal     Platelet Estimate Adequate     Large Platelets Slight/1+    CBC Auto Differential [[id number]]  (Abnormal) Collected: 01/29/25 2329    Specimen: Blood Updated: 01/30/25 0005     WBC 4.95 10*3/mm3      RBC 3.55 10*6/mm3      Hemoglobin 10.8 g/dL      Hematocrit 32.9 %      MCV 92.7 fL      MCH 30.4 pg      MCHC 32.8 g/dL      RDW 13.1 %      RDW-SD 44.6 fl      MPV 11.9 fL      Platelets 148 10*3/mm3     Comprehensive Metabolic Panel [[id number]]  (Abnormal) Collected: 01/29/25 2329    Specimen: Blood Updated: 01/29/25 2359     Glucose 97 mg/dL      BUN 27 mg/dL      Creatinine 1.49 mg/dL      Sodium 136 mmol/L      Potassium 5.0 mmol/L      Comment: Slight hemolysis detected by analyzer. Result may be falsely elevated.        Chloride 100 mmol/L      CO2 23.7 mmol/L      Calcium 8.6 mg/dL      Total Protein 6.0 g/dL      Albumin 3.7  g/dL      ALT (SGPT) 8 U/L      AST (SGOT) 22 U/L      Alkaline Phosphatase 69 U/L      Total Bilirubin 0.2 mg/dL      Globulin 2.3 gm/dL      A/G Ratio 1.6 g/dL      BUN/Creatinine Ratio 18.1     Anion Gap 12.3 mmol/L      eGFR 47.4 mL/min/1.73     Narrative:      GFR Categories in Chronic Kidney Disease (CKD)      GFR Category          GFR (mL/min/1.73)    Interpretation  G1                     90 or greater         Normal or high (1)  G2                      60-89                Mild decrease (1)  G3a                   45-59                Mild to moderate decrease  G3b                   30-44                Moderate to severe decrease  G4                    15-29                Severe decrease  G5                    14 or less           Kidney failure          (1)In the absence of evidence of kidney disease, neither GFR category G1 or G2 fulfill the criteria for CKD.    eGFR calculation 2021 CKD-EPI creatinine equation, which does not include race as a factor    Lipase [[id number]]  (Normal) Collected: 01/29/25 2329    Specimen: Blood Updated: 01/29/25 2356     Lipase 20 U/L     High Sensitivity Troponin T [[id number]]  (Abnormal) Collected: 01/29/25 2329    Specimen: Blood Updated: 01/29/25 2356     HS Troponin T 25 ng/L     Narrative:      High Sensitive Troponin T Reference Range:  <14.0 ng/L- Negative Female for AMI  <22.0 ng/L- Negative Male for AMI  >=14 - Abnormal Female indicating possible myocardial injury.  >=22 - Abnormal Male indicating possible myocardial injury.   Clinicians would have to utilize clinical acumen, EKG, Troponin, and serial changes to determine if it is an Acute Myocardial Infarction or myocardial injury due to an underlying chronic condition.         aPTT [[id number]]  (Abnormal) Collected: 01/29/25 2329    Specimen: Blood Updated: 01/29/25 2350     PTT 36.7 seconds     Protime-INR [[id number]]  (Abnormal) Collected: 01/29/25 2329    Specimen: Blood Updated: 01/29/25 2350     Protime  15.2 Seconds      INR 1.19          Imaging Results (Last 72 Hours)       Procedure Component Value Units Date/Time    XR Chest 1 View [[id number]] Collected: 01/29/25 2311     Updated: 01/29/25 2316    Narrative:      XR CHEST 1 VW    Date of Exam: 1/29/2025 10:55 PM EST    Indication: chest pain    Comparison: Chest radiograph 12/27/2021    Findings:  There are postoperative changes from midline sternotomy and coronary bypass grafting. The heart is enlarged. There are prominent interstitial changes throughout both lungs likely from pulmonary edema. There is a density in the right upper lobe near the   apex which is somewhat spiculated what may contain calcium evaluation with chest CT would be recommended.      Impression:      Impression:  1.Cardiomegaly with pulmonary edema.  2.Spiculated density in the right upper lobe. CT of the chest is recommended.          Electronically Signed: Lv Muniz MD    1/29/2025 11:13 PM EST    Workstation ID: [id number]            Results for orders placed in visit on 07/08/20    SCANNED - ECHOCARDIOGRAM      Medication Review  Scheduled Meds:aspirin, 325 mg, Oral, Daily  atenolol, 50 mg, Oral, Daily  atorvastatin, 10 mg, Oral, Daily  clopidogrel, 75 mg, Oral, Daily  [Held by provider] isosorbide mononitrate, 60 mg, Oral, BID - Nitrates  mupirocin, 1 Application, Each Nare, BID  oseltamivir, 30 mg, Oral, Q12H  pantoprazole, 40 mg, Oral, Q AM  sodium chloride, 10 mL, Intravenous, Q12H      Continuous Infusions:nitroglycerin, 10-50 mcg/min, Last Rate: 100 mcg/min (01/30/25 0624)      PRN Meds:.  acetaminophen    senna-docusate sodium **AND** polyethylene glycol **AND** bisacodyl **AND** bisacodyl    Calcium Replacement - Follow Nurse / BPA Driven Protocol    HYDROmorphone    Magnesium Standard Dose Replacement - Follow Nurse / BPA Driven Protocol    nitroglycerin    Phosphorus Replacement - Follow Nurse / BPA Driven Protocol    Potassium Replacement - Follow Nurse / BPA Driven  Protocol    sodium chloride    sodium chloride    Lab Results   Component Value Date    GLUCOSE 97 01/29/2025    BUN 27 (H) 01/29/2025    CREATININE 1.49 (H) 01/29/2025    EGFR 47.4 (L) 01/29/2025    BCR 18.1 01/29/2025    K 5.0 01/29/2025    CO2 23.7 01/29/2025    CALCIUM 8.6 01/29/2025    ALBUMIN 3.7 01/29/2025    BILITOT 0.2 01/29/2025    AST 22 01/29/2025    ALT 8 01/29/2025     Results for orders placed during the hospital encounter of 12/27/21    Cardiac Catheterization/Vascular Study    Narrative  Table formatting from the original result was not included.  Cardiac Catheterization Operative Report    Parker LERNER Fareed  [phone]  12/27/2021  @PCP@    He underwent cardiac catheterization.    Indications for the procedure include: chest pain.    Procedure Details:  The risks, benefits, complications, treatment options, and expected outcomes were discussed with the patient. The patient and/or family concurred with the proposed plan, giving informed consent.    After informed consent the patient was brought to the cath lab after appropriate IV hydration was begun and oral premedication was given. He was further sedated with fentanyl. He was prepped and draped in the usual manner. Using the modified Seldinger access technique, a 6 Martiniquais sheath was placed in the femoral artery. A left heart catheterization with coronary arteriography was performed. Findings are discussed below.    After the procedure was completed, sedation was stopped and the sheaths and catheters were all removed. Hemostasis was achieved per established hospital protocols.    Conscious sedation:  Conscious sedation was performed according to protocol.  I supervised and directed an independent trained observer with the assistance of monitoring the patient's level of consciousness and physiologic status throughout the procedure.  Intraoperative service time was 60 minutes.    Findings:    Hemodynamics Central aortic pressure systolic 139 diastolic  66 with mean pressure of 95 mmHg    Left Main  left main is a large-caliber vessel with a mid angiographic 50 to 60% stenosis  The percent occlusion of the ostial LAD and ostial circumflex provides only a small size ramus intermediate branch  RCA  right coronary artery is a large-caliber vessel 100% occlusion in the proximal portion after the conus branch  LAD  100% occluded in the ostial portion  Circ  100% occluded in the ostial portion  SVG(s)  patent vein graft to the marginal with proximal ostial angiographic 90% stenosis and distal diffuse 90% in-stent restenosis that was attempted PCI in the past and unable to do delivery of balloon or stent secondary to multiple stents  Patent vein graft to the PDA with a diffuse ulcerated plaque in the proximal and midportion with angiographic 60% stenosis  No significant stenosis involving the PDA branch  PLB branch of the right coronary artery has a proximal angiographic 70 to 80% stenosis  Distally this bifurcates into 2 branches and the bottom inferior branch has a ostial angiographic 90% stenosis  This provides collaterals to the left circumflex system  KAYLI patent LIMA to the LAD without any significant stenosis involving the ostium/body or the anastomotic site  LV  not done  Coronary Dominance  right coronary artery    Estimated Blood Loss:  Minimal    Specimens: None    Complications:  None; patient tolerated the procedure well.    Disposition: PACU - hemodynamically stable.    Condition: stable    Impressions:  Severe native three-vessel coronary disease with 100% occlusion of the ostial % occlusion of the ostial circumflex and 100% occlusion of the proximal right coronary artery  Patent LIMA to the LAD  Patent vein graft to the marginal with a significant stenosis involving the proximal ostial portion and also anastomotic site the site was attempted for PCI but unable to do successful PCI secondary to inability to deliver balloons and stents in the  "past  Patent vein graft to the PDA with a significant stenosis involving the PLB branch at multiple sites high risk for intervention secondary to tortuosity and diffuse nature of the disease      Recommendations:  Maximize medical therapy and antianginal therapy  Consider high risk intervention to the vein graft to the marginal and vein graft to the PLB if patient continues to have chest discomfort  Add Ranexa 500 mg p.o. twice daily  Test results reviewed and discussed with patient and family     Results for orders placed in visit on 07/08/20    SCANNED - ECHOCARDIOGRAM     No results found for: \"CHOL\", \"CHLPL\", \"TRIG\", \"HDL\", \"LDL\", \"LDLDIRECT\"         Assessment & Plan       Chest pain          Emily Rivera MD  01/30/25  07:35 EST               "

## 2025-01-30 NOTE — H&P
[facility] Medicine Services  History & Physical    Patient Name: Parker Guerrier  : 1945  MRN: [id number]  Primary Care Physician:  Beth Young  Date of admission: 2025  Date and Time of Service: 2025 at 2300    Subjective      Chief Complaint: chest pain    History of Present Illness: Parker Guerrier is a 79 y.o. male with a CMH of comes in complaining of CAD status post CABG, diabetes mellitus, COPD, history of atrial flutter, who presented to [facility] on 2025 with comes in complaining of chest pain since this morning.  Patient reports a mild nonproductive cough since this morning as well.  Patient states the pain is throughout center of her chest that is nonradiating and nonexertional.  Patient did report some nausea but no vomiting.  Patient states spouse recently was diagnosed with pneumonia.  Patient denies any fever chills, vomiting or diarrhea.  Patient denies any bilateral leg swelling.      In the ED  at [facility], CBC showed hemoglobin of 12.3, platelets of 149, CMP showed serum creatinine of 1.5, GFR of 46, no previous to compare.  Initial high-sensitivity troponin was negative at 8.  CT PE study was done given elevated D-dimer and no evidence of PE or acute chest finding.  Cholelithiasis seen.  EKG showed normal sinus rhythm 88 bpm with lateral T wave inversions, that was new when compared to the initial EKG done in the ER.  RBBB present.  Influenza A positive.  Patient was afebrile, pulse in the 80s, on room air oxygen 96% SpO2 and blood pressure normotensive.  Patient was started on nitro drip, cardiology was consulted and agreed with therapeutic dose Lovenox at this time.      Review of Systems as per HPI    Personal History     Past Medical History:   Diagnosis Date    COPD (chronic obstructive pulmonary disease)     Coronary artery disease     Diabetes mellitus     Hx of atrial flutter     Hyperlipidemia     Hypertension     DION (obstructive sleep  apnea)      no cpap       Past Surgical History:   Procedure Laterality Date    ABDOMINAL AORTIC ANEURYSM REPAIR  05/2020    CARDIAC CATHETERIZATION      CARDIAC CATHETERIZATION Right 12/27/2021    Procedure: Left Heart Cath;  Surgeon: Emily Rivera MD;  Location: [facility] CATH INVASIVE LOCATION;  Service: Cardiovascular;  Laterality: Right;    CORONARY ARTERY BYPASS GRAFT  12/1992    x 3    HERNIA REPAIR         Family History: family history is not on file. Otherwise pertinent FHx was reviewed and not pertinent to current issue.    Social History:  reports that he has been smoking cigarettes. He has never used smokeless tobacco. He reports that he does not currently use alcohol. He reports that he does not use drugs.    Home Medications:  Prior to Admission Medications       Prescriptions Last Dose Informant Patient Reported? Taking?    albuterol (PROVENTIL) (2.5 MG/3ML) 0.083% nebulizer solution   Yes No    Take 2.5 mg by nebulization Every 4 (Four) Hours As Needed for Wheezing.    aspirin 325 MG tablet  Self Yes No    Take 1 tablet by mouth Daily.    atenolol (TENORMIN) 50 MG tablet   No No    Take 1 tablet by mouth once daily    clopidogrel (PLAVIX) 75 MG tablet   No No    Take 1 tablet by mouth once daily    ezetimibe (ZETIA) 10 MG tablet   No No    Take 1 tablet by mouth Daily.    isosorbide mononitrate (IMDUR) 60 MG 24 hr tablet   No No    Take 1 tablet by mouth twice daily    metFORMIN (GLUCOPHAGE) 500 MG tablet   Yes No    Take 500 mg by mouth 2 (Two) Times a Day With Meals.    nitroglycerin (Nitrostat) 0.4 MG SL tablet   No No    Place 1 tablet under the tongue Every 5 (Five) Minutes As Needed for Chest Pain.    omeprazole (priLOSEC) 20 MG capsule   Yes No    Take 20 mg by mouth Daily.    pravastatin (PRAVACHOL) 40 MG tablet   No No    Take 1 tablet by mouth once daily    triamcinolone (KENALOG) 0.5 % ointment   Yes No    Apply 1 application topically to the appropriate area as directed 2 (Two)  Times a Day As Needed for Rash (elbows).              Allergies:  Allergies   Allergen Reactions    Morphine Other (See Comments)       Objective      Vitals:   Temp:  [98.3 °F (36.8 °C)] 98.3 °F (36.8 °C)  Heart Rate:  [79] 79  BP: (122)/(68) 122/68  Body mass index is 31.45 kg/m².  Physical Exam  Vitals and nursing note reviewed.   Constitutional:       General: He is not in acute distress.     Appearance: Normal appearance. He is well-developed. He is not diaphoretic.   HENT:      Head: Normocephalic and atraumatic.      Right Ear: External ear normal.      Left Ear: External ear normal.      Nose: Nose normal.      Mouth/Throat:      Mouth: Mucous membranes are moist.   Eyes:      Extraocular Movements: Extraocular movements intact.      Conjunctiva/sclera: Conjunctivae normal.      Pupils: Pupils are equal, round, and reactive to light.   Cardiovascular:      Rate and Rhythm: Normal rate and regular rhythm.      Pulses: Normal pulses.      Heart sounds: Normal heart sounds.      Comments: S1, S2 audible.  Pulmonary:      Effort: Pulmonary effort is normal. No respiratory distress.      Breath sounds: Normal breath sounds. No wheezing, rhonchi or rales.      Comments: On room air.  Abdominal:      General: Bowel sounds are normal. There is no distension.      Palpations: Abdomen is soft.      Tenderness: There is no abdominal tenderness. There is no guarding or rebound.   Musculoskeletal:         General: No tenderness or deformity. Normal range of motion.      Cervical back: Normal range of motion.   Skin:     General: Skin is warm.      Capillary Refill: Capillary refill takes less than 2 seconds.      Findings: No erythema or rash.   Neurological:      Mental Status: He is alert and oriented to person, place, and time.      Cranial Nerves: No cranial nerve deficit.   Psychiatric:         Mood and Affect: Mood normal.         Behavior: Behavior normal.         Diagnostic Data:  Lab Results (last 24 hours)        Procedure Component Value Units Date/Time    High Sensitivity Troponin T 1Hr [[id number]]  (Abnormal) Collected: 01/30/25 0043    Specimen: Blood Updated: 01/30/25 0118     HS Troponin T 27 ng/L      Troponin T Numeric Delta 2 ng/L      Troponin T % Delta 8    Narrative:      High Sensitive Troponin T Reference Range:  <14.0 ng/L- Negative Female for AMI  <22.0 ng/L- Negative Male for AMI  >=14 - Abnormal Female indicating possible myocardial injury.  >=22 - Abnormal Male indicating possible myocardial injury.   Clinicians would have to utilize clinical acumen, EKG, Troponin, and serial changes to determine if it is an Acute Myocardial Infarction or myocardial injury due to an underlying chronic condition.         Respiratory Panel PCR w/COVID-19(SARS-CoV-2) RITA/MARISA/BONG/PAD/COR/DIANE In-House, NP Swab in UTM/VTM, 2 HR TAT - Swab, Nasopharynx [[id number]]  (Abnormal) Collected: 01/29/25 2334    Specimen: Swab from Nasopharynx Updated: 01/30/25 0027     ADENOVIRUS, PCR Not Detected     Coronavirus 229E Not Detected     Coronavirus HKU1 Not Detected     Coronavirus NL63 Not Detected     Coronavirus OC43 Not Detected     COVID19 Not Detected     Human Metapneumovirus Not Detected     Human Rhinovirus/Enterovirus Not Detected     Influenza A H1 2009 PCR Detected     Influenza B PCR Not Detected     Parainfluenza Virus 1 Not Detected     Parainfluenza Virus 2 Not Detected     Parainfluenza Virus 3 Not Detected     Parainfluenza Virus 4 Not Detected     RSV, PCR Not Detected     Bordetella pertussis pcr Not Detected     Bordetella parapertussis PCR Not Detected     Chlamydophila pneumoniae PCR Not Detected     Mycoplasma pneumo by PCR Not Detected    Narrative:      In the setting of a positive respiratory panel with a viral infection PLUS a negative procalcitonin without other underlying concern for bacterial infection, consider observing off antibiotics or discontinuation of antibiotics and continue supportive care. If  the respiratory panel is positive for atypical bacterial infection (Bordetella pertussis, Chlamydophila pneumoniae, or Mycoplasma pneumoniae), consider antibiotic de-escalation to target atypical bacterial infection.    CBC & Differential [[id number]]  (Abnormal) Collected: 01/29/25 2329    Specimen: Blood Updated: 01/30/25 0005    Narrative:      The following orders were created for panel order CBC & Differential.  Procedure                               Abnormality         Status                     ---------                               -----------         ------                     Manual Differential[[id number]]          Abnormal            Final result               CBC Auto Differential[[id number]]                                                         Please view results for these tests on the individual orders.    CBC & Differential [[id number]]  (Abnormal) Collected: 01/29/25 2329    Specimen: Blood Updated: 01/30/25 0005    Narrative:      The following orders were created for panel order CBC & Differential.  Procedure                               Abnormality         Status                     ---------                               -----------         ------                     CBC Auto Differential[[id number]]        Abnormal            Final result                 Please view results for these tests on the individual orders.    Manual Differential [[id number]]  (Abnormal) Collected: 01/29/25 2329    Specimen: Blood Updated: 01/30/25 0005     Neutrophil % 79.0 %      Lymphocyte % 2.0 %      Monocyte % 5.0 %      Basophil % 1.0 %      Bands %  13.0 %      Neutrophils Absolute 4.55 10*3/mm3      Lymphocytes Absolute 0.10 10*3/mm3      Monocytes Absolute 0.25 10*3/mm3      Basophils Absolute 0.05 10*3/mm3      Crenated RBC's Slight/1+     Ovalocytes Slight/1+     Poikilocytes Slight/1+     WBC Morphology Normal     Platelet Estimate Adequate     Large Platelets Slight/1+    CBC Auto Differential [[id number]]   (Abnormal) Collected: 01/29/25 2329    Specimen: Blood Updated: 01/30/25 0005     WBC 4.95 10*3/mm3      RBC 3.55 10*6/mm3      Hemoglobin 10.8 g/dL      Hematocrit 32.9 %      MCV 92.7 fL      MCH 30.4 pg      MCHC 32.8 g/dL      RDW 13.1 %      RDW-SD 44.6 fl      MPV 11.9 fL      Platelets 148 10*3/mm3     Comprehensive Metabolic Panel [[id number]]  (Abnormal) Collected: 01/29/25 2329    Specimen: Blood Updated: 01/29/25 2359     Glucose 97 mg/dL      BUN 27 mg/dL      Creatinine 1.49 mg/dL      Sodium 136 mmol/L      Potassium 5.0 mmol/L      Comment: Slight hemolysis detected by analyzer. Result may be falsely elevated.        Chloride 100 mmol/L      CO2 23.7 mmol/L      Calcium 8.6 mg/dL      Total Protein 6.0 g/dL      Albumin 3.7 g/dL      ALT (SGPT) 8 U/L      AST (SGOT) 22 U/L      Alkaline Phosphatase 69 U/L      Total Bilirubin 0.2 mg/dL      Globulin 2.3 gm/dL      A/G Ratio 1.6 g/dL      BUN/Creatinine Ratio 18.1     Anion Gap 12.3 mmol/L      eGFR 47.4 mL/min/1.73     Narrative:      GFR Categories in Chronic Kidney Disease (CKD)      GFR Category          GFR (mL/min/1.73)    Interpretation  G1                     90 or greater         Normal or high (1)  G2                      60-89                Mild decrease (1)  G3a                   45-59                Mild to moderate decrease  G3b                   30-44                Moderate to severe decrease  G4                    15-29                Severe decrease  G5                    14 or less           Kidney failure          (1)In the absence of evidence of kidney disease, neither GFR category G1 or G2 fulfill the criteria for CKD.    eGFR calculation 2021 CKD-EPI creatinine equation, which does not include race as a factor    Lipase [[id number]]  (Normal) Collected: 01/29/25 2329    Specimen: Blood Updated: 01/29/25 2356     Lipase 20 U/L     High Sensitivity Troponin T [[id number]]  (Abnormal) Collected: 01/29/25 2329    Specimen: Blood  Updated: 01/29/25 2356     HS Troponin T 25 ng/L     Narrative:      High Sensitive Troponin T Reference Range:  <14.0 ng/L- Negative Female for AMI  <22.0 ng/L- Negative Male for AMI  >=14 - Abnormal Female indicating possible myocardial injury.  >=22 - Abnormal Male indicating possible myocardial injury.   Clinicians would have to utilize clinical acumen, EKG, Troponin, and serial changes to determine if it is an Acute Myocardial Infarction or myocardial injury due to an underlying chronic condition.         aPTT [[id number]]  (Abnormal) Collected: 01/29/25 2329    Specimen: Blood Updated: 01/29/25 2350     PTT 36.7 seconds     Protime-INR [[id number]]  (Abnormal) Collected: 01/29/25 2329    Specimen: Blood Updated: 01/29/25 2350     Protime 15.2 Seconds      INR 1.19             Imaging Results (Last 24 Hours)       Procedure Component Value Units Date/Time    XR Chest 1 View [[id number]] Collected: 01/29/25 2311     Updated: 01/29/25 2316    Narrative:      XR CHEST 1 VW    Date of Exam: 1/29/2025 10:55 PM EST    Indication: chest pain    Comparison: Chest radiograph 12/27/2021    Findings:  There are postoperative changes from midline sternotomy and coronary bypass grafting. The heart is enlarged. There are prominent interstitial changes throughout both lungs likely from pulmonary edema. There is a density in the right upper lobe near the   apex which is somewhat spiculated what may contain calcium evaluation with chest CT would be recommended.      Impression:      Impression:  1.Cardiomegaly with pulmonary edema.  2.Spiculated density in the right upper lobe. CT of the chest is recommended.          Electronically Signed: Lv Muniz MD    1/29/2025 11:13 PM EST    Workstation ID: [id number]              Assessment & Plan        This is a 79 y.o. male with chest pain.     Active and Resolved Problems  Active Hospital Problems    Diagnosis  POA    **Chest pain [R07.9]  Yes      Resolved Hospital Problems   No  resolved problems to display.       Chest pain  Abnormal EKG  Hx of CAD, sp CABG, stents  - CBC showed hemoglobin of 12.3,   -platelets of 149,   - Initial high-sensitivity troponin was negative at 8.    -CT PE study was done given elevated D-dimer and no evidence of PE or acute chest finding.  Cholelithiasis seen.    -EKG showed normal sinus rhythm 88 bpm with lateral T wave inversions, that was new when compared to the initial EKG done in the ER.  RBBB present.   -Patient was afebrile, pulse in the 80s, on room air oxygen 96% SpO2 and blood pressure normotensive.    -Patient was started on nitro drip, cardiology was consulted and agreed with therapeutic dose Lovenox at this time.  -Cardiology consult  -Continue nitro drip  -Patient given therapeutic dose Lovenox at 1600 on 1/29/2025  -Repeat troponin, EKG, lipid panel  -Continuous cardiac monitoring  -Continue home aspirin and Plavix  -Heart healthy diet n.p.o. midnight      Influenza A infection  -Start Tamiflu    CKD III  -CMP showed serum creatinine of 1.5, GFR of 46, no previous to compare.     Diabetes mellitus type 2  -SSI, Accu-Cheks 3 times daily AC  -Check A1c  -Hold home metformin    Gerd  -continue prilosec    HLD  -conitnue statin    Essential hypertension  -Continue home atenolol        VTE Prophylaxis: lovenox  Pharmacologic & mechanical VTE prophylaxis orders are present.        The patient desires to be as follows:    CODE STATUS:    Medical Intervention Limits: No intubation (DNI)  Code Status (Patient has no pulse and is not breathing): No CPR (Do Not Attempt to Resuscitate)  Medical Interventions (Patient has pulse or is breathing): Limited Support      Admission Status:  I believe this patient meets inpatient status.    Expected Length of Stay: 2 days    PDMP and Medication Dispenses via Sidebar reviewed and consistent with patient reported medications.    I discussed the patient's findings and my recommendations with patient.      Signature:      This document has been electronically signed by BINA Dixon on January 30, 2025 01:52 [facility]

## 2025-01-30 NOTE — CASE MANAGEMENT/SOCIAL WORK
Discharge Planning Assessment   Rl     Patient Name: Parker Guerrier  MRN: [id number]  Today's Date: 1/30/2025    Admit Date: 1/29/2025    Plan: DC Plan: Anticipate routine home with spouse. Daughter Adrienne will provide transportation home.   Discharge Needs Assessment       Row Name 01/30/25 1405       Living Environment    People in Home spouse    Name(s) of People in Home Jessica Guerrier    Current Living Arrangements home    Potentially Unsafe Housing Conditions none    In the past 12 months has the electric, gas, oil, or water company threatened to shut off services in your home? No    Primary Care Provided by self    Provides Primary Care For no one    Family Caregiver if Needed spouse    Family Caregiver Names Jessica Guerrier    Quality of Family Relationships helpful;involved;supportive    Able to Return to Prior Arrangements yes       Resource/Environmental Concerns    Resource/Environmental Concerns none    Transportation Concerns none       Transportation Needs    In the past 12 months, has lack of transportation kept you from medical appointments or from getting medications? no    In the past 12 months, has lack of transportation kept you from meetings, work, or from getting things needed for daily living? No       Food Insecurity    Within the past 12 months, you worried that your food would run out before you got the money to buy more. Never true    Within the past 12 months, the food you bought just didn't last and you didn't have money to get more. Never true       Transition Planning    Patient/Family Anticipates Transition to home with family    Patient/Family Anticipated Services at Transition none    Transportation Anticipated car, drives self;family or friend will provide       Discharge Needs Assessment    Readmission Within the Last 30 Days no previous admission in last 30 days    Equipment Currently Used at Home none    Concerns to be Addressed discharge planning    Do you want help finding or  keeping work or a job? Patient unable to answer    Do you want help with school or training? For example, starting or completing job training or getting a high school diploma, GED or equivalent Patient unable to answer    Anticipated Changes Related to Illness none    Equipment Needed After Discharge none    Provided Post Acute Provider List? N/A    Provided Post Acute Provider Quality & Resource List? N/A                   Expected Discharge Date and Time       Expected Discharge Date Expected Discharge Time    Feb 1, 2025            Demographic Summary       Row Name 01/30/25 1404       General Information    Admission Type inpatient    Arrived From other (see comments)  Direct Admission    Required Notices Provided Important Message from Medicare    Referral Source admission list    Reason for Consult discharge planning    Preferred Language English       Contact Information    Permission Granted to Share Info With                    Functional Status       Row Name 01/30/25 1404       Functional Status    Usual Activity Tolerance good    Current Activity Tolerance other (see comments)  SKYLAR    Functional Status Comments All information obtained from patient daughter Adrienne. Patient having echocardiogram completed at time of assessment.       Physical Activity    On average, how many days per week do you engage in moderate to strenuous exercise (like a brisk walk)? 0 days    On average, how many minutes do you engage in exercise at this level? 0 min    Number of minutes of exercise per week 0       Functional Status, IADL    Medications independent    Meal Preparation independent    Housekeeping independent    Laundry independent    Shopping independent    If for any reason you need help with day-to-day activities such as bathing, preparing meals, shopping, managing finances, etc., do you get the help you need? I don't need any help       Mental Status    General Appearance WDL WDL       Mental Status  Summary    Recent Changes in Mental Status/Cognitive Functioning no changes       Employment/    Employment Status retired    Current or Previous Occupation not applicable                     Leyla Marcus, RAFFI    Office Phone: [phone]  Office Cell:     [phone]

## 2025-01-31 ENCOUNTER — APPOINTMENT (OUTPATIENT)
Dept: ULTRASOUND IMAGING | Facility: HOSPITAL | Age: 80
End: 2025-01-31
Payer: MEDICARE

## 2025-01-31 LAB
ACT BLD: 135 SECONDS (ref 89–137)
ALBUMIN SERPL-MCNC: 3.9 G/DL (ref 3.5–5.2)
ALBUMIN/GLOB SERPL: 1.6 G/DL
ALP SERPL-CCNC: 71 U/L (ref 39–117)
ALT SERPL W P-5'-P-CCNC: 14 U/L (ref 1–41)
ANION GAP SERPL CALCULATED.3IONS-SCNC: 9.9 MMOL/L (ref 5–15)
APTT PPP: 159.7 SECONDS (ref 22.7–35.4)
APTT PPP: 48.5 SECONDS (ref 22.7–35.4)
AST SERPL-CCNC: 43 U/L (ref 1–40)
BILIRUB SERPL-MCNC: 0.2 MG/DL (ref 0–1.2)
BUN SERPL-MCNC: 33 MG/DL (ref 8–23)
BUN/CREAT SERPL: 22.4 (ref 7–25)
CALCIUM SPEC-SCNC: 9.1 MG/DL (ref 8.6–10.5)
CHLORIDE SERPL-SCNC: 97 MMOL/L (ref 98–107)
CO2 SERPL-SCNC: 25.1 MMOL/L (ref 22–29)
CREAT SERPL-MCNC: 1.47 MG/DL (ref 0.76–1.27)
EGFRCR SERPLBLD CKD-EPI 2021: 48.2 ML/MIN/1.73
GLOBULIN UR ELPH-MCNC: 2.4 GM/DL
GLUCOSE BLDC GLUCOMTR-MCNC: 141 MG/DL (ref 70–105)
GLUCOSE BLDC GLUCOMTR-MCNC: 158 MG/DL (ref 70–105)
GLUCOSE BLDC GLUCOMTR-MCNC: 166 MG/DL (ref 70–105)
GLUCOSE BLDC GLUCOMTR-MCNC: 225 MG/DL (ref 70–105)
GLUCOSE BLDC GLUCOMTR-MCNC: 243 MG/DL (ref 70–105)
GLUCOSE SERPL-MCNC: 157 MG/DL (ref 65–99)
MAGNESIUM SERPL-MCNC: 2.1 MG/DL (ref 1.6–2.4)
PHOSPHATE SERPL-MCNC: 4.3 MG/DL (ref 2.5–4.5)
POTASSIUM SERPL-SCNC: 4.6 MMOL/L (ref 3.5–5.2)
PROT SERPL-MCNC: 6.3 G/DL (ref 6–8.5)
QT INTERVAL: 474 MS
QTC INTERVAL: 493 MS
SODIUM SERPL-SCNC: 132 MMOL/L (ref 136–145)

## 2025-01-31 PROCEDURE — 94799 UNLISTED PULMONARY SVC/PX: CPT

## 2025-01-31 PROCEDURE — B2131ZZ FLUOROSCOPY OF MULTIPLE CORONARY ARTERY BYPASS GRAFTS USING LOW OSMOLAR CONTRAST: ICD-10-PCS | Performed by: INTERNAL MEDICINE

## 2025-01-31 PROCEDURE — 25010000002 MIDAZOLAM PER 1 MG: Performed by: INTERNAL MEDICINE

## 2025-01-31 PROCEDURE — B2181ZZ FLUOROSCOPY OF LEFT INTERNAL MAMMARY BYPASS GRAFT USING LOW OSMOLAR CONTRAST: ICD-10-PCS | Performed by: INTERNAL MEDICINE

## 2025-01-31 PROCEDURE — 93010 ELECTROCARDIOGRAM REPORT: CPT | Performed by: INTERNAL MEDICINE

## 2025-01-31 PROCEDURE — C1894 INTRO/SHEATH, NON-LASER: HCPCS | Performed by: INTERNAL MEDICINE

## 2025-01-31 PROCEDURE — 99232 SBSQ HOSP IP/OBS MODERATE 35: CPT | Performed by: INTERNAL MEDICINE

## 2025-01-31 PROCEDURE — 25510000001 IOPAMIDOL PER 1 ML: Performed by: INTERNAL MEDICINE

## 2025-01-31 PROCEDURE — 93455 CORONARY ART/GRFT ANGIO S&I: CPT | Performed by: INTERNAL MEDICINE

## 2025-01-31 PROCEDURE — 82948 REAGENT STRIP/BLOOD GLUCOSE: CPT | Performed by: HOSPITALIST

## 2025-01-31 PROCEDURE — 99153 MOD SED SAME PHYS/QHP EA: CPT | Performed by: INTERNAL MEDICINE

## 2025-01-31 PROCEDURE — C1769 GUIDE WIRE: HCPCS | Performed by: INTERNAL MEDICINE

## 2025-01-31 PROCEDURE — 63710000001 INSULIN LISPRO (HUMAN) PER 5 UNITS: Performed by: HOSPITALIST

## 2025-01-31 PROCEDURE — 82948 REAGENT STRIP/BLOOD GLUCOSE: CPT

## 2025-01-31 PROCEDURE — 25010000002 METHYLPREDNISOLONE PER 40 MG: Performed by: HOSPITALIST

## 2025-01-31 PROCEDURE — 25810000003 SODIUM CHLORIDE 0.9 % SOLUTION 250 ML FLEX CONT: Performed by: INTERNAL MEDICINE

## 2025-01-31 PROCEDURE — B2111ZZ FLUOROSCOPY OF MULTIPLE CORONARY ARTERIES USING LOW OSMOLAR CONTRAST: ICD-10-PCS | Performed by: INTERNAL MEDICINE

## 2025-01-31 PROCEDURE — 63710000001 INSULIN LISPRO (HUMAN) PER 5 UNITS: Performed by: INTERNAL MEDICINE

## 2025-01-31 PROCEDURE — 94664 DEMO&/EVAL PT USE INHALER: CPT

## 2025-01-31 PROCEDURE — 84100 ASSAY OF PHOSPHORUS: CPT | Performed by: INTERNAL MEDICINE

## 2025-01-31 PROCEDURE — 25010000002 LIDOCAINE 1 % SOLUTION: Performed by: INTERNAL MEDICINE

## 2025-01-31 PROCEDURE — 25010000002 FENTANYL CITRATE (PF) 100 MCG/2ML SOLUTION: Performed by: INTERNAL MEDICINE

## 2025-01-31 PROCEDURE — 85730 THROMBOPLASTIN TIME PARTIAL: CPT | Performed by: INTERNAL MEDICINE

## 2025-01-31 PROCEDURE — 76775 US EXAM ABDO BACK WALL LIM: CPT

## 2025-01-31 PROCEDURE — 85730 THROMBOPLASTIN TIME PARTIAL: CPT | Performed by: HOSPITALIST

## 2025-01-31 PROCEDURE — 83735 ASSAY OF MAGNESIUM: CPT | Performed by: INTERNAL MEDICINE

## 2025-01-31 PROCEDURE — 4A023N7 MEASUREMENT OF CARDIAC SAMPLING AND PRESSURE, LEFT HEART, PERCUTANEOUS APPROACH: ICD-10-PCS | Performed by: INTERNAL MEDICINE

## 2025-01-31 PROCEDURE — 94761 N-INVAS EAR/PLS OXIMETRY MLT: CPT

## 2025-01-31 PROCEDURE — 80053 COMPREHEN METABOLIC PANEL: CPT | Performed by: INTERNAL MEDICINE

## 2025-01-31 PROCEDURE — 93005 ELECTROCARDIOGRAM TRACING: CPT | Performed by: INTERNAL MEDICINE

## 2025-01-31 PROCEDURE — 99152 MOD SED SAME PHYS/QHP 5/>YRS: CPT | Performed by: INTERNAL MEDICINE

## 2025-01-31 PROCEDURE — 85347 COAGULATION TIME ACTIVATED: CPT

## 2025-01-31 PROCEDURE — 25010000002 NICARDIPINE 2.5 MG/ML SOLUTION 10 ML VIAL: Performed by: INTERNAL MEDICINE

## 2025-01-31 RX ORDER — ONDANSETRON 4 MG/1
4 TABLET, ORALLY DISINTEGRATING ORAL EVERY 6 HOURS PRN
Status: DISCONTINUED | OUTPATIENT
Start: 2025-01-31 | End: 2025-02-01 | Stop reason: HOSPADM

## 2025-01-31 RX ORDER — ISOSORBIDE MONONITRATE 30 MG/1
60 TABLET, EXTENDED RELEASE ORAL
Status: DISCONTINUED | OUTPATIENT
Start: 2025-01-31 | End: 2025-02-01 | Stop reason: HOSPADM

## 2025-01-31 RX ORDER — METHYLPREDNISOLONE SODIUM SUCCINATE 40 MG/ML
20 INJECTION, POWDER, LYOPHILIZED, FOR SOLUTION INTRAMUSCULAR; INTRAVENOUS EVERY 12 HOURS
Status: DISCONTINUED | OUTPATIENT
Start: 2025-01-31 | End: 2025-02-01

## 2025-01-31 RX ORDER — ACETAMINOPHEN 325 MG/1
650 TABLET ORAL EVERY 4 HOURS PRN
Status: DISCONTINUED | OUTPATIENT
Start: 2025-01-31 | End: 2025-02-01 | Stop reason: HOSPADM

## 2025-01-31 RX ORDER — RANOLAZINE 500 MG/1
500 TABLET, EXTENDED RELEASE ORAL EVERY 12 HOURS SCHEDULED
Status: DISCONTINUED | OUTPATIENT
Start: 2025-01-31 | End: 2025-02-01 | Stop reason: HOSPADM

## 2025-01-31 RX ORDER — FENTANYL CITRATE 50 UG/ML
INJECTION, SOLUTION INTRAMUSCULAR; INTRAVENOUS
Status: DISCONTINUED | OUTPATIENT
Start: 2025-01-31 | End: 2025-01-31 | Stop reason: HOSPADM

## 2025-01-31 RX ORDER — MIDAZOLAM HYDROCHLORIDE 1 MG/ML
INJECTION, SOLUTION INTRAMUSCULAR; INTRAVENOUS
Status: DISCONTINUED | OUTPATIENT
Start: 2025-01-31 | End: 2025-01-31 | Stop reason: HOSPADM

## 2025-01-31 RX ORDER — NITROGLYCERIN 0.4 MG/1
0.4 TABLET SUBLINGUAL
Status: DISCONTINUED | OUTPATIENT
Start: 2025-01-31 | End: 2025-02-01 | Stop reason: HOSPADM

## 2025-01-31 RX ORDER — ONDANSETRON 2 MG/ML
4 INJECTION INTRAMUSCULAR; INTRAVENOUS EVERY 6 HOURS PRN
Status: DISCONTINUED | OUTPATIENT
Start: 2025-01-31 | End: 2025-02-01 | Stop reason: HOSPADM

## 2025-01-31 RX ORDER — SODIUM CHLORIDE 9 MG/ML
75 INJECTION, SOLUTION INTRAVENOUS CONTINUOUS
Status: DISPENSED | OUTPATIENT
Start: 2025-02-01 | End: 2025-02-01

## 2025-01-31 RX ORDER — LIDOCAINE HYDROCHLORIDE 10 MG/ML
INJECTION, SOLUTION INFILTRATION; PERINEURAL
Status: DISCONTINUED | OUTPATIENT
Start: 2025-01-31 | End: 2025-01-31 | Stop reason: HOSPADM

## 2025-01-31 RX ORDER — IOPAMIDOL 755 MG/ML
INJECTION, SOLUTION INTRAVASCULAR
Status: DISCONTINUED | OUTPATIENT
Start: 2025-01-31 | End: 2025-01-31 | Stop reason: HOSPADM

## 2025-01-31 RX ADMIN — METHYLPREDNISOLONE SODIUM SUCCINATE 40 MG: 40 INJECTION, POWDER, FOR SOLUTION INTRAMUSCULAR; INTRAVENOUS at 01:35

## 2025-01-31 RX ADMIN — PANTOPRAZOLE SODIUM 40 MG: 40 TABLET, DELAYED RELEASE ORAL at 05:32

## 2025-01-31 RX ADMIN — ATENOLOL 50 MG: 50 TABLET ORAL at 07:57

## 2025-01-31 RX ADMIN — MUPIROCIN 1 APPLICATION: 20 OINTMENT TOPICAL at 08:14

## 2025-01-31 RX ADMIN — Medication 10 ML: at 08:15

## 2025-01-31 RX ADMIN — SODIUM CHLORIDE 5 MG/HR: 9 INJECTION, SOLUTION INTRAVENOUS at 14:35

## 2025-01-31 RX ADMIN — ATORVASTATIN CALCIUM 10 MG: 10 TABLET ORAL at 08:15

## 2025-01-31 RX ADMIN — IPRATROPIUM BROMIDE AND ALBUTEROL SULFATE 3 ML: .5; 3 SOLUTION RESPIRATORY (INHALATION) at 10:58

## 2025-01-31 RX ADMIN — OSELTAMIVIR PHOSPHATE 30 MG: 30 CAPSULE ORAL at 20:37

## 2025-01-31 RX ADMIN — ASPIRIN 81 MG: 81 TABLET, COATED ORAL at 08:14

## 2025-01-31 RX ADMIN — OSELTAMIVIR PHOSPHATE 30 MG: 30 CAPSULE ORAL at 08:14

## 2025-01-31 RX ADMIN — MUPIROCIN 1 APPLICATION: 20 OINTMENT TOPICAL at 20:37

## 2025-01-31 RX ADMIN — ISOSORBIDE MONONITRATE 60 MG: 30 TABLET, EXTENDED RELEASE ORAL at 18:02

## 2025-01-31 RX ADMIN — IPRATROPIUM BROMIDE AND ALBUTEROL SULFATE 3 ML: .5; 3 SOLUTION RESPIRATORY (INHALATION) at 20:58

## 2025-01-31 RX ADMIN — INSULIN LISPRO 2 UNITS: 100 INJECTION, SOLUTION INTRAVENOUS; SUBCUTANEOUS at 09:40

## 2025-01-31 RX ADMIN — RANOLAZINE 500 MG: 500 TABLET, EXTENDED RELEASE ORAL at 20:37

## 2025-01-31 RX ADMIN — INSULIN LISPRO 2 UNITS: 100 INJECTION, SOLUTION INTRAVENOUS; SUBCUTANEOUS at 18:02

## 2025-01-31 RX ADMIN — METHYLPREDNISOLONE SODIUM SUCCINATE 20 MG: 40 INJECTION, POWDER, FOR SOLUTION INTRAMUSCULAR; INTRAVENOUS at 20:37

## 2025-01-31 RX ADMIN — CLOPIDOGREL BISULFATE 75 MG: 75 TABLET ORAL at 08:14

## 2025-01-31 RX ADMIN — IPRATROPIUM BROMIDE AND ALBUTEROL SULFATE 3 ML: .5; 3 SOLUTION RESPIRATORY (INHALATION) at 15:36

## 2025-01-31 RX ADMIN — INSULIN LISPRO 3 UNITS: 100 INJECTION, SOLUTION INTRAVENOUS; SUBCUTANEOUS at 20:38

## 2025-01-31 RX ADMIN — IPRATROPIUM BROMIDE AND ALBUTEROL SULFATE 3 ML: .5; 3 SOLUTION RESPIRATORY (INHALATION) at 07:00

## 2025-01-31 RX ADMIN — Medication 10 ML: at 20:37

## 2025-01-31 RX ADMIN — METHYLPREDNISOLONE SODIUM SUCCINATE 40 MG: 40 INJECTION, POWDER, FOR SOLUTION INTRAMUSCULAR; INTRAVENOUS at 09:41

## 2025-01-31 RX ADMIN — SODIUM CHLORIDE 5 MG/HR: 9 INJECTION, SOLUTION INTRAVENOUS at 17:42

## 2025-01-31 NOTE — PROGRESS NOTES
Cardiology [facility]      Patient Care Team:  Beth Young as PCP - General (Nurse Practitioner)  Emily Rivera MD as Consulting Physician (Cardiology)    Cardiology assessment and plan     Chest discomfort  Coronary artery disease  Prior coronary artery bypass surgery  Chest pain with some pleuritic component  Prior coronary artery disease prior coronary artery bypass surgery  Prior PCI  Residual significant coronary artery disease involving the circumflex artery that is not amenable for intervention  Hypertension  Hyperlipidemia  Past 2 influenza A  Chronic kidney disease  Diabetes mellitus  Tmax is 97.8 pulse is 52-72 respirations are 18 blood pressure is 147/62 sats are 94%  Sodium is 132 potassium is 4.6 creatinine is 1.47  Respiratory viral panel is positive for influenza  Chest pain is somewhat atypical for angina  Twelve-lead EKG shows sinus rhythm with right bundle branch block and left intrafascicular block  CT chest with no lung mass  Cardiac diagnosis and treatment options reviewed and discussed the patient  Prior medical records reviewed  Prior cath findings reviewed  Diagnosis and treatment options risk benefits and alternatives reviewed and discussed patient  Patient is advised to consider further invasive workup for further evaluation and treatment options  Patient was initially reluctant but now patient is agreeable  Plan to schedule patient for cardiac catheterization tomorrow  Schedule for cardiac catheterization today  Risk-benefit and alternatives reviewed and discussed patient              Chief Complaint: Chest pain    Subjective continue to have intermittent chest discomfort    Interval History: No significant change in overall status    History taken from: patient    Review of Systems:  Review of Systems   Constitutional: Negative for chills, decreased appetite and malaise/fatigue.   HENT:  Negative for congestion and nosebleeds.    Eyes:  Negative for blurred vision and double  "vision.   Cardiovascular:  Positive for chest pain. Negative for dyspnea on exertion, irregular heartbeat, leg swelling, near-syncope, orthopnea, palpitations and paroxysmal nocturnal dyspnea.   Respiratory:  Negative for cough and shortness of breath.    Hematologic/Lymphatic: Negative for adenopathy. Does not bruise/bleed easily.   Skin:  Negative for color change and rash.   Musculoskeletal:  Negative for back pain and joint pain.   Gastrointestinal:  Negative for bloating, abdominal pain, hematemesis and hematochezia.   Genitourinary:  Negative for flank pain and hematuria.   Neurological:  Negative for dizziness and focal weakness.   Psychiatric/Behavioral:  Negative for altered mental status. The patient does not have insomnia.        Objective    Vital Signs  Visit Vitals  /62   Pulse 53   Temp 97.7 °F (36.5 °C) (Oral)   Resp 18   Ht 174 cm (68.5\")   Wt 94.8 kg (209 lb)   SpO2 95%   BMI 31.32 kg/m²     Oxygen Therapy  SpO2: 95 %  Pulse Oximetry Type: Continuous  Device (Oxygen Therapy): nasal cannula  Flow (L/min) (Oxygen Therapy): 1  Flowsheet Rows      Flowsheet Row First Filed Value   Admission Height 174 cm (68.5\") Documented at 01/29/2025 2200   Admission Weight 95.2 kg (209 lb 14.1 oz) Documented at 01/29/2025 2200          Intake & Output (last 3 days)         01/28 0701  01/29 0700 01/29 0701  01/30 0700 01/30 0701  01/31 0700 01/31 0701  02/01 0700    P.O.   730     I.V. (mL/kg)  718 (7.5) 923 (9.7)     Total Intake(mL/kg)  718 (7.5) 1653 (17.4)     Urine (mL/kg/hr)  550 1650 (0.7) 1200 (1.5)    Emesis/NG output  0 0     Stool  0 0     Total Output  550 1650 1200    Net  +168 +3 -1200            Urine Unmeasured Occurrence   0 x     Stool Unmeasured Occurrence   0 x     Emesis Unmeasured Occurrence   0 x           Lines, Drains & Airways       Active LDAs       Name Placement date Placement time Site Days    Peripheral IV 01/30/25 0000 Anterior;Left Forearm 01/30/25  0000  Forearm  1    " Arterial Sheath 6 Fr. Right Femoral 01/31/25  1307  Femoral  less than 1                    Physical Exam:  Constitutional:       Appearance: Well-developed.   Eyes:      Conjunctiva/sclera: Conjunctivae normal.      Pupils: Pupils are equal, round, and reactive to light.   HENT:      Head: Normocephalic and atraumatic.   Neck:      Thyroid: No thyromegaly.   Pulmonary:      Effort: Pulmonary effort is normal.      Breath sounds: Normal breath sounds.   Cardiovascular:      Normal rate. Regular rhythm.   Pulses:     Intact distal pulses.   Edema:     Peripheral edema absent.   Abdominal:      General: Bowel sounds are normal.      Palpations: Abdomen is soft.   Musculoskeletal:      Cervical back: Normal range of motion and neck supple. Skin:     General: Skin is warm.   Neurological:      Mental Status: Alert and oriented to person, place, and time.         Results Review:     I reviewed the patient's new clinical results.    Lab Results (last 24 hours)       Procedure Component Value Units Date/Time    POC Glucose 4x Daily Before Meals & at Bedtime [[id number]]  (Abnormal) Collected: 01/31/25 1121    Specimen: Blood Updated: 01/31/25 1124     Glucose 141 mg/dL      Comment: Serial Number: [id number]:  [id number]       aPTT [[id number]]  (Abnormal) Collected: 01/31/25 0638    Specimen: Blood Updated: 01/31/25 0955     PTT 48.5 seconds     POC Glucose 4x Daily Before Meals & at Bedtime [[id number]]  (Abnormal) Collected: 01/31/25 0726    Specimen: Blood Updated: 01/31/25 0733     Glucose 158 mg/dL      Comment: Serial Number: [id number]:  [id number]       Comprehensive Metabolic Panel [[id number]]  (Abnormal) Collected: 01/31/25 0456    Specimen: Blood Updated: 01/31/25 0548     Glucose 157 mg/dL      BUN 33 mg/dL      Creatinine 1.47 mg/dL      Sodium 132 mmol/L      Potassium 4.6 mmol/L      Chloride 97 mmol/L      CO2 25.1 mmol/L      Calcium 9.1 mg/dL      Total Protein 6.3 g/dL      Albumin 3.9 g/dL       ALT (SGPT) 14 U/L      AST (SGOT) 43 U/L      Alkaline Phosphatase 71 U/L      Total Bilirubin 0.2 mg/dL      Globulin 2.4 gm/dL      A/G Ratio 1.6 g/dL      BUN/Creatinine Ratio 22.4     Anion Gap 9.9 mmol/L      eGFR 48.2 mL/min/1.73     Narrative:      GFR Categories in Chronic Kidney Disease (CKD)      GFR Category          GFR (mL/min/1.73)    Interpretation  G1                     90 or greater         Normal or high (1)  G2                      60-89                Mild decrease (1)  G3a                   45-59                Mild to moderate decrease  G3b                   30-44                Moderate to severe decrease  G4                    15-29                Severe decrease  G5                    14 or less           Kidney failure          (1)In the absence of evidence of kidney disease, neither GFR category G1 or G2 fulfill the criteria for CKD.    eGFR calculation 2021 CKD-EPI creatinine equation, which does not include race as a factor    Magnesium [[id number]]  (Normal) Collected: 01/31/25 0456    Specimen: Blood Updated: 01/31/25 0548     Magnesium 2.1 mg/dL     Phosphorus [[id number]]  (Normal) Collected: 01/31/25 0456    Specimen: Blood Updated: 01/31/25 0548     Phosphorus 4.3 mg/dL     aPTT [[id number]]  (Abnormal) Collected: 01/31/25 0456    Specimen: Blood Updated: 01/31/25 0523     .7 seconds     aPTT [[id number]]  (Abnormal) Collected: 01/30/25 2134    Specimen: Blood from Arm, Right Updated: 01/30/25 2220     PTT 79.7 seconds     POC Glucose Once [[id number]]  (Abnormal) Collected: 01/30/25 2131    Specimen: Blood Updated: 01/30/25 2138     Glucose 151 mg/dL      Comment: Serial Number: [id number]:  [id number]       aPTT [[id number]]  (Abnormal) Collected: 01/30/25 1931    Specimen: Blood Updated: 01/30/25 1952     .6 seconds     POC Glucose Once [[id number]]  (Abnormal) Collected: 01/30/25 1630    Specimen: Blood Updated: 01/30/25 1632     Glucose 224 mg/dL      Comment:  Serial Number: [id number]:  [id number]             Results for orders placed during the hospital encounter of 01/29/25    Adult Transthoracic Echo Complete W/ Cont if Necessary Per Protocol    Interpretation Summary    Left ventricular systolic function is hyperdynamic (EF > 70%). Calculated left ventricular EF = 70.7% Left ventricular ejection fraction appears to be 66 - 70%.    Left ventricular wall thickness is consistent with mild concentric hypertrophy.    Left ventricular diastolic function is consistent with (grade II w/high LAP) pseudonormalization.    The left atrial cavity is moderately dilated.    Mild aortic valve stenosis is present.    Estimated right ventricular systolic pressure from tricuspid regurgitation is normal (<35 mmHg).        Medication Review:   I have reviewed the patient's current medication list  Scheduled Meds:aspirin, 81 mg, Oral, Daily  atenolol, 50 mg, Oral, Daily  atorvastatin, 10 mg, Oral, Daily  clopidogrel, 75 mg, Oral, Daily  insulin lispro, 2-7 Units, Subcutaneous, 4x Daily AC & at Bedtime  ipratropium-albuterol, 3 mL, Nebulization, 4x Daily - RT  [Held by provider] isosorbide mononitrate, 60 mg, Oral, BID - Nitrates  methylPREDNISolone sodium succinate, 40 mg, Intravenous, Q8H  mupirocin, 1 Application, Each Nare, BID  oseltamivir, 30 mg, Oral, Q12H  pantoprazole, 40 mg, Oral, Q AM  sodium chloride, 10 mL, Intravenous, Q12H      Continuous Infusions:niCARdipine, 5-15 mg/hr, Last Rate: 7.5 mg/hr (01/31/25 1525)  [START ON 2/1/2025] sodium chloride, 75 mL/hr      PRN Meds:.  acetaminophen    acetaminophen    atropine    senna-docusate sodium **AND** polyethylene glycol **AND** bisacodyl **AND** bisacodyl    Calcium Replacement - Follow Nurse / BPA Driven Protocol    dextrose    dextrose    glucagon (human recombinant)    HYDROmorphone    Magnesium Standard Dose Replacement - Follow Nurse / BPA Driven Protocol    nitroglycerin    nitroglycerin    ondansetron ODT **OR**  ondansetron    Phosphorus Replacement - Follow Nurse / BPA Driven Protocol    Potassium Replacement - Follow Nurse / BPA Driven Protocol    sodium chloride    sodium chloride    ECG/EMG Results (last 24 hours)       Procedure Component Value Units Date/Time    Telemetry Scan [[id number]] Resulted: 01/29/25     Updated: 01/31/25 0505    ECG 12 Lead Chest Pain [[id number]] Collected: 01/31/25 0745     Updated: 01/31/25 0746     QT Interval 474 ms      QTC Interval 493 ms     Narrative:      HEART RATE=65  bpm  RR Gucoioik=123  ms  TN Interval=71  ms  P Horizontal Axis=  deg  P Front Axis=31  deg  QRSD Giubkyhc=130  ms  QT Hhpurhes=526  ms  UPbL=607  ms  QRS Axis=-50  deg  T Wave Axis=40  deg  - ABNORMAL ECG -  Sinus rhythm  RBBB and LAFB  Date and Time of Study:2025-01-31 07:45:55    Telemetry Scan [[id number]] Resulted: 01/29/25     Updated: 01/31/25 0829    Telemetry Scan [[id number]] Resulted: 01/29/25     Updated: 01/31/25 0855    Telemetry Scan [[id number]] Resulted: 01/29/25     Updated: 01/31/25 0858    Telemetry Scan [[id number]] Resulted: 01/29/25     Updated: 01/31/25 1132            Imaging Results (Last 24 Hours)       ** No results found for the last 24 hours. **          Results for orders placed during the hospital encounter of 01/29/25    Cardiac Catheterization/Vascular Study    Conclusion  Table formatting from the original result was not included.  Cardiac Catheterization Operative Report    Parker Guerrier  [id number]  1/31/2025  @PCP@    He underwent cardiac catheterization.    Indications for the procedure include: acute coronary syndrome.    Results for orders placed during the hospital encounter of 01/29/25    Adult Transthoracic Echo Complete W/ Cont if Necessary Per Protocol    Interpretation Summary    Left ventricular systolic function is hyperdynamic (EF > 70%). Calculated left ventricular EF = 70.7% Left ventricular ejection fraction appears to be 66 - 70%.    Left ventricular wall thickness is  consistent with mild concentric hypertrophy.    Left ventricular diastolic function is consistent with (grade II w/high LAP) pseudonormalization.    The left atrial cavity is moderately dilated.    Mild aortic valve stenosis is present.    Estimated right ventricular systolic pressure from tricuspid regurgitation is normal (<35 mmHg).    No results found for this or any previous visit.        Procedure Details:  The risks, benefits, complications, treatment options, and expected outcomes were discussed with the patient. The patient and/or family concurred with the proposed plan, giving informed consent.    After informed consent the patient was brought to the cath lab after appropriate IV hydration was begun and oral premedication was given. He was further sedated with fentanyl. He was prepped and draped in the usual manner. Using the modified Seldinger access technique, a 6 [place]i sheath was placed in the femoral artery. A left heart catheterization with coronary arteriography was performed.  We used the 5 [place]i multipurpose catheter to do the selective engagement of the vein graft to the PDA and we used a 5 [place]i IMT catheter to do the selective engagement of the LIMA to the LAD and we used JL 3 diagnostic catheter to do the selective engagement of the vein graft to the marginal, findings are discussed below.    After the procedure was completed, sedation was stopped and the sheaths and catheters were all removed. Hemostasis was achieved per established hospital protocols.    Conscious sedation:  Conscious sedation was performed according to protocol.  I supervised and directed an independent trained observer with the assistance of monitoring the patient's level of consciousness and physiologic status throughout the procedure.  Intraoperative service time was 60 minutes.    Findings:    Hemodynamics Central artery pressure systolic 166 diastolic 74 with a mean pressure of 108 mmHg  Left Main  % occluded  in the proximal portion  % occluded in the proximal portion  Circ 100% occluded in the proximal portion  SVG(s) Vein graft to the PDA is patent with angiographic 30 to 40% stenosis involving the mid and mid to distal portion  No angiographic significant stenosis involving the ostium body  Anastomotic site of the vein graft to the distal right coronary artery is angiograph 50% stenosis  PDA branch of the right coronary artery after the anastomotic touchdown is a moderate caliber vessel angiographically normal  PLB branch of the right coronary artery after the anastomotic touchdown has a true grade crores with significant 360 degrees band it has a prior stent with diffuse in-stent restenosis with angiographic areas of 70 to 80% stenosis  Distal portion of this PLB branch has another focal area of angiographic 8090% stenosis but vessel is too small to do any further intervention    Vein graft to the marginal had a patent stent in the proximal portion with a proximal angiographic in-stent restenosis of 60 to 70% unchanged from before  This vein graft in the distal portion has prior stent with in-stent restenosis with angiographic areas of 80 to 90% stenosis unchanged from before provides small to moderate caliber marginal branch.  This was attempted for PCI and stenting in the past and unsuccessful  KAYLI KUMAR is patent without any significant stenosis involving the ostium/body at the anastomotic site.  Unable to do direct injection of the LIMA secondary to tortuosity and unable to engage the LIMA after multiple attempts reviewing prior angiograms there is no significant disease in the LAD after the LIMA touchdown  LV Not done  Coronary Dominance Right coronary artery    Estimated Blood Loss:  Minimal    Specimens: None    Complications:  None; patient tolerated the procedure well.    Disposition: PACU - hemodynamically stable.    Condition: stable    Impressions:  Severe native three-vessel coronary artery disease  with 100% occlusion of the % occlusion of the circumflex artery and 100% occlusion of the proximal right coronary artery  Patent LIMA to the LAD  Patent vein graft to the marginal with significant in-stent restenosis involving the anastomotic site  Patent vein graft to the PDA with a significant in-stent restenosis in the retrograde limb of the PLB branch of the right coronary artery  No new cardiac disease to explain patient's symptoms of chest discomfort the disease in the marginal branch and the PLB branch of the right coronary artery was attempted and unsuccessful secondary to technical difficulties  Plan to manage conservatively medical therapy if patient fails medical therapy will consider high risk intervention    Recommendations:  Optimize medical therapy for coronary artery disease  Test results reviewed and discussed patient and family     Results for orders placed during the hospital encounter of 01/29/25    Adult Transthoracic Echo Complete W/ Cont if Necessary Per Protocol    Interpretation Summary    Left ventricular systolic function is hyperdynamic (EF > 70%). Calculated left ventricular EF = 70.7% Left ventricular ejection fraction appears to be 66 - 70%.    Left ventricular wall thickness is consistent with mild concentric hypertrophy.    Left ventricular diastolic function is consistent with (grade II w/high LAP) pseudonormalization.    The left atrial cavity is moderately dilated.    Mild aortic valve stenosis is present.    Estimated right ventricular systolic pressure from tricuspid regurgitation is normal (<35 mmHg).     Lab Results   Component Value Date    GLUCOSE 157 (H) 01/31/2025    BUN 33 (H) 01/31/2025    CREATININE 1.47 (H) 01/31/2025    EGFR 48.2 (L) 01/31/2025    BCR 22.4 01/31/2025    K 4.6 01/31/2025    CO2 25.1 01/31/2025    CALCIUM 9.1 01/31/2025    ALBUMIN 3.9 01/31/2025    BILITOT 0.2 01/31/2025    AST 43 (H) 01/31/2025    ALT 14 01/31/2025      Lab Results   Component  Value Date    CHOL 123 01/30/2025    TRIG 90 01/30/2025    HDL 45 01/30/2025    LDL 61 01/30/2025      Lab Results   Component Value Date    TROPONINT 27 (H) 01/30/2025        Assessment & Plan       Chest pain        Emily Rivera MD  01/31/25  15:43 EST

## 2025-01-31 NOTE — CONSULTS
Patient Name: Parker Guerrier  : 1945  MRN: [id number]  Primary Care Physician: Beth Young  Date of admission: 2025    Patient Care Team:  Beth Young as PCP - General (Nurse Practitioner)  Emily Rivera MD as Consulting Physician (Cardiology)        Reason for Consult:       MARQUES vs CKD    Subjective   History of Present Illness:   Chief Complaint: No chief complaint on file.    HISTORY:  Parker Guerrier is a 79 y.o. male with PMH significant for CAD s/p CABG, prior PCI, PCI and stenting, DM, COPD, h/o aflutter, HTN, hld who presented to hospital on  with chest pain and nausea. He was ultimately diagnosed with unstable angina and possile non-ST elevation and infarction, started on nitro and hep gtt. Cardiology was consulted. ECHO done yesterday showed EF 66-70%, G2DD, mild aortic valve stenosis.Creatinine on admit was 1.5, today is 1.4. Only previous reading available to me is from  which showed level of 1.3. Very likely with CKD and may be at baseline. He is not currently on, nor does he take at home, any ACE/ARBs or diuertics. Did have contrast exposure yesterday with CT chest PE protocol which was negative. BNP elevated at 2506. Also diagnosed with Influenza A, started on Tamiflu. Cardiology was consulted and plan for cardiac cath today. Nephrology services requested for MARQUES vs CKD, cardiac cath clearance.         Review of systems:  ROS was otherwise negative except as mentioned in the [place].       Personal History:     Past Medical History:   Past Medical History:   Diagnosis Date    COPD (chronic obstructive pulmonary disease)     Coronary artery disease     Diabetes mellitus     Hx of atrial flutter     Hyperlipidemia     Hypertension     DION (obstructive sleep apnea)      no cpap       Surgical History:      Past Surgical History:   Procedure Laterality Date    ABDOMINAL AORTIC ANEURYSM REPAIR  2020    CARDIAC CATHETERIZATION      CARDIAC CATHETERIZATION  Right 12/27/2021    Procedure: Left Heart Cath;  Surgeon: Emily Rivera MD;  Location: [facility] CATH INVASIVE LOCATION;  Service: Cardiovascular;  Laterality: Right;    CORONARY ARTERY BYPASS GRAFT  12/1992    x 3    HERNIA REPAIR         Family History: family history is not on file. Otherwise pertinent FHx was reviewed and unremarkable.     Social History:  reports that he has been smoking cigarettes. He has never used smokeless tobacco. He reports that he does not currently use alcohol. He reports that he does not use drugs.    Medications:  Prior to Admission medications    Medication Sig Start Date End Date Taking? Authorizing Provider   albuterol (PROVENTIL) (2.5 MG/3ML) 0.083% nebulizer solution Take 2.5 mg by nebulization Every 4 (Four) Hours As Needed for Wheezing.    Janine Fisher MD   aspirin 325 MG tablet Take 1 tablet by mouth Daily.    Janine Fisher MD   atenolol (TENORMIN) 50 MG tablet Take 1 tablet by mouth once daily 1/15/25   Emily Rivera MD   clopidogrel (PLAVIX) 75 MG tablet Take 1 tablet by mouth once daily 1/15/25   Emily Rivera MD   ezetimibe (ZETIA) 10 MG tablet Take 1 tablet by mouth Daily. 1/14/25   Emily Rivera MD   isosorbide mononitrate (IMDUR) 60 MG 24 hr tablet Take 1 tablet by mouth twice daily 6/11/24   Emily Rivera MD   metFORMIN (GLUCOPHAGE) 500 MG tablet Take 500 mg by mouth 2 (Two) Times a Day With Meals. 1/10/19   Janine Fisher MD   nitroglycerin (Nitrostat) 0.4 MG SL tablet Place 1 tablet under the tongue Every 5 (Five) Minutes As Needed for Chest Pain. 4/23/24   Emily Rivera MD   omeprazole (priLOSEC) 20 MG capsule Take 20 mg by mouth Daily. 12/26/13   Janine Fisher MD   pravastatin (PRAVACHOL) 40 MG tablet Take 1 tablet by mouth once daily 10/15/24   Emily Rivera MD   triamcinolone (KENALOG) 0.5 % ointment Apply 1 application topically to the appropriate area as directed  2 (Two) Times a Day As Needed for Rash (elbows). 1/10/19   Provider, MD Janine     Scheduled Meds:aspirin, 81 mg, Oral, Daily  atenolol, 50 mg, Oral, Daily  atorvastatin, 10 mg, Oral, Daily  clopidogrel, 75 mg, Oral, Daily  insulin lispro, 2-7 Units, Subcutaneous, 4x Daily AC & at Bedtime  ipratropium-albuterol, 3 mL, Nebulization, 4x Daily - RT  [Held by provider] isosorbide mononitrate, 60 mg, Oral, BID - Nitrates  methylPREDNISolone sodium succinate, 40 mg, Intravenous, Q8H  mupirocin, 1 Application, Each Nare, BID  oseltamivir, 30 mg, Oral, Q12H  pantoprazole, 40 mg, Oral, Q AM  sodium chloride, 10 mL, Intravenous, Q12H      Continuous Infusions:heparin, 10.5 Units/kg/hr, Last Rate: Stopped (01/31/25 1047)  nitroglycerin, 10-50 mcg/min, Last Rate: Stopped (01/31/25 1047)      PRN Meds:  acetaminophen    senna-docusate sodium **AND** polyethylene glycol **AND** bisacodyl **AND** bisacodyl    Calcium Replacement - Follow Nurse / BPA Driven Protocol    dextrose    dextrose    glucagon (human recombinant)    heparin    heparin    HYDROmorphone    Magnesium Standard Dose Replacement - Follow Nurse / BPA Driven Protocol    nitroglycerin    Phosphorus Replacement - Follow Nurse / BPA Driven Protocol    Potassium Replacement - Follow Nurse / BPA Driven Protocol    sodium chloride    sodium chloride  Allergies:    Allergies   Allergen Reactions    Morphine Other (See Comments)       Objective   Exam:     Vital Signs  Temp:  [97.9 °F (36.6 °C)-98.3 °F (36.8 °C)] 98 °F (36.7 °C)  Heart Rate:  [51-72] 52  Resp:  [18-24] 19  BP: (111-173)/(55-99) 172/87  SpO2:  [91 %-100 %] 100 %  on  Flow (L/min) (Oxygen Therapy):  [2] 2;   Device (Oxygen Therapy): nasal cannula  Body mass index is 31.32 kg/m².  EXAM  General:   male in no acute distress.    Head:      Normocephalic and atraumatic.    Eyes:      PERRL/EOM intact, conjunctivae and sclerae clear without nystagmus.    Neck:      No masses, thyromegaly,  trachea central    Lungs:    Mild bilateral crackles  Heart:      Regular rate and rhythm, no murmur no gallop  Abd:        Soft, nontender, not distended, bowel sounds positive, no shifting dullness.  Msk:        No deformity or scoliosis noted of thoracic or lumbar spine.    Pulses:   Pulses normal in all 4 extremities.    Extremities:        No cyanosis or clubbing--positive edema.    Neuro:    No focal deficits.   alert oriented x3  Skin:       Intact without lesions or rashes.    Psych:    Alert and cooperative; normal mood and affect; normal attention span       Results Review:  I have personally reviewed most recent Data :  [facility]  CBC    Results from last 7 days   Lab Units 01/29/25  2329   WBC 10*3/mm3 4.95   HEMOGLOBIN g/dL 10.8*   PLATELETS 10*3/mm3 148     CMP   Results from last 7 days   Lab Units 01/31/25  0456 01/29/25  2329   SODIUM mmol/L 132* 136   POTASSIUM mmol/L 4.6 5.0   CHLORIDE mmol/L 97* 100   CO2 mmol/L 25.1 23.7   BUN mg/dL 33* 27*   CREATININE mg/dL 1.47* 1.49*   GLUCOSE mg/dL 157* 97   ALBUMIN g/dL 3.9 3.7   BILIRUBIN mg/dL 0.2 0.2   ALK PHOS U/L 71 69   AST (SGOT) U/L 43* 22   ALT (SGPT) U/L 14 8   LIPASE U/L  --  20     ABG      CT Chest Without Contrast Diagnostic    Result Date: 1/30/2025  Impression: 1. 1.5 cm focal groundglass density in the subpleural right upper lobe, favored represent focal pneumonitis or mild atelectasis. No lung mass lesion is identified. 2. 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 Dimensions as above. 3. Severe coronary calcifications with signs of CABG. Correlate with known cardiac history. 4. Uncomplicated cholelithiasis. Electronically Signed: Orly Cervantes MD  1/30/2025 10:20 AM EST  Workstation ID: [id number]    XR Chest 1 View    Result Date: 1/29/2025  Impression: 1.Cardiomegaly with pulmonary edema. 2.Spiculated density in the right upper lobe. CT of the chest is recommended. Electronically Signed: Lv Muniz MD  1/29/2025 11:13 PM  EST  Workstation ID: [id number]     Results for orders placed during the hospital encounter of 01/29/25    Adult Transthoracic Echo Complete W/ Cont if Necessary Per Protocol    Interpretation Summary    Left ventricular systolic function is hyperdynamic (EF > 70%). Calculated left ventricular EF = 70.7% Left ventricular ejection fraction appears to be 66 - 70%.    Left ventricular wall thickness is consistent with mild concentric hypertrophy.    Left ventricular diastolic function is consistent with (grade II w/high LAP) pseudonormalization.    The left atrial cavity is moderately dilated.    Mild aortic valve stenosis is present.    Estimated right ventricular systolic pressure from tricuspid regurgitation is normal (<35 mmHg).        Assessment & Plan   Assessment and Plan:         Chest pain    ASSESSMENT:  MARQUES vs CKD  Chest pain, angina vs possible non-ST elevation, possible infarction   CAD s/p CABG, PCI, stents   HTN  DM  H/o Aflutter   Newly diagnosed CHF  COPD      ECHO 1/30/25: EF 66-70%, G2DD, mild aortic stenosis           PLAN :     Patient with MARQUES vs CKD, he may be at his baseline, only previous level known to me is from 2021 which showed creatinine of 1.3. Since admission creatinine has been 1.4-1.5 mg/dL. Will check comprehensive urine studies, as well as Renal US   At risk for worsening renal functions given recent contrast nephropathy and upcoming contrast exposure with cardiac cath. Risks vs benefits discussed with patients and okay to proceed. Currently on hep and nitro gtt    Not on any ACE/ARBs or diuretics, continue to hold. I will not start IVFs at this time due to underlying CHF   Watch volume status closely. Most recent BNP elevated at 2506. CXR showed cardiomegaly and pulmonary edema. Will very likely need diuretic in next day or so   Blood pressure on the high side, s/p dose of Clonidine and Hydralazine, monitor   Patient was able to lie flat on the bed with oxygen saturation of 97%  without getting short of breath  No extra IV fluid noted needed okay for heart cath  Sodium low normal, likely from underlying volume, will monitor for now. Remaining electrolytes/acid base stable   Anemia, mild, check iron studies if begins to trend down   Influenza A, started on Tamiflu, also receiving neb txts and prednisone   Daily weights   Avoid nephrotoxins, monitor I/O   Daily labs   Thank you for this consultation, we will gladly follow       [facility]  1/31/2025  11:16 EST

## 2025-01-31 NOTE — PROGRESS NOTES
[facility] MEDICINE SERVICE  DAILY PROGRESS NOTE    NAME: Parker Guerrier  : 1945  MRN: [id number]      LOS: 2 days     PROVIDER OF SERVICE: Timothy Duane Brammell, MD    Chief Complaint: Chest pain    Subjective:     Interval History:  History taken from: patient    Patient now status post catheterization.  On IV antihypertensive medication.  Denies any present chest pain or shortness of breath.  Nurses unaware of any other additional acute concerns.        Review of Systems:   Review of Systems   All other systems reviewed and are negative.      Objective:     Vital Signs  Temp:  [97.7 °F (36.5 °C)-98.3 °F (36.8 °C)] 97.8 °F (36.6 °C)  Heart Rate:  [51-72] 55  Resp:  [16-24] 18  BP: (125-189)/(62-99) 137/62  Flow (L/min) (Oxygen Therapy):  [1-2] 1   Body mass index is 31.32 kg/m².    Physical Exam  Physical Exam  Vitals reviewed.   Constitutional:       General: He is not in acute distress.  Cardiovascular:      Rate and Rhythm: Normal rate and regular rhythm.   Pulmonary:      Effort: Pulmonary effort is normal.      Breath sounds: Normal breath sounds.   Abdominal:      General: There is no distension.      Palpations: Abdomen is soft.   Musculoskeletal:         General: No swelling.   Neurological:      Mental Status: He is alert. Mental status is at baseline.            Diagnostic Data    Results from last 7 days   Lab Units 25  0456 25  2329   WBC 10*3/mm3  --  4.95   HEMOGLOBIN g/dL  --  10.8*   HEMATOCRIT %  --  32.9*   PLATELETS 10*3/mm3  --  148   GLUCOSE mg/dL 157* 97   CREATININE mg/dL 1.47* 1.49*   BUN mg/dL 33* 27*   SODIUM mmol/L 132* 136   POTASSIUM mmol/L 4.6 5.0   AST (SGOT) U/L 43* 22   ALT (SGPT) U/L 14 8   ALK PHOS U/L 71 69   BILIRUBIN mg/dL 0.2 0.2   ANION GAP mmol/L 9.9 12.3       CT Chest Without Contrast Diagnostic    Result Date: 2025  Impression: 1. 1.5 cm focal groundglass density in the subpleural right upper lobe, favored represent focal pneumonitis  or mild atelectasis. No lung mass lesion is identified. 2. 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 Dimensions as above. 3. Severe coronary calcifications with signs of CABG. Correlate with known cardiac history. 4. Uncomplicated cholelithiasis. Electronically Signed: Orly Cervantes MD  1/30/2025 10:20 AM EST  Workstation ID: [id number]    XR Chest 1 View    Result Date: 1/29/2025  Impression: 1.Cardiomegaly with pulmonary edema. 2.Spiculated density in the right upper lobe. CT of the chest is recommended. Electronically Signed: Lv Muniz MD  1/29/2025 11:13 PM EST  Workstation ID: [id number]           Assessment/Plan:   Chest pain/history of coronary artery disease/CABG  Influenza A   COPD with acute exacerbation  type 2 diabetes  Chronic kidney disease stage III  Anemia      Plan.  As per cardiology.  Wean IV steroids.  Ongoing nebulization therapy.  Complete Tamiflu.        Active and Resolved Problems  Active Hospital Problems    Diagnosis  POA    **Chest pain [R07.9]  Yes      Resolved Hospital Problems   No resolved problems to display.           VTE Prophylaxis:  Mechanical VTE prophylaxis orders are present.             Disposition Planning:     Barriers to Discharge:cardiology clearance  Anticipated Date of Discharge: 2/1  Place of Discharge: home      Time: 30 minutes     Code Status and Medical Interventions: No CPR (Do Not Attempt to Resuscitate); Limited Support; No intubation (DNI)   Ordered at: 01/29/25 4598     Medical Intervention Limits:    No intubation (DNI)     Code Status (Patient has no pulse and is not breathing):    No CPR (Do Not Attempt to Resuscitate)     Medical Interventions (Patient has pulse or is breathing):    Limited Support       Signature: Electronically signed by Timothy Duane Brammell, MD, 01/31/25, 16:11 EST.  [facility] Hospitalist Team

## 2025-01-31 NOTE — CASE MANAGEMENT/SOCIAL WORK
Continued Stay Note  [facility]     Patient Name: Parker Guerrier  MRN: [id number]  Today's Date: 1/31/2025    Admit Date: 1/29/2025    Plan: DC Plan: Anticipate routine home with spouse. Daughter Adrienne will provide transportation home.   Discharge Plan       Row Name 01/31/25 1301       Plan    Plan Comments DC barriers: 2L NC, nephro/cardio, NPO, L heart cath 1/31, IV steriods, tamiflu, mulitple IV gtt's, electrolyte managment, PTT (159.7) critical.               Expected Discharge Date and Time       Expected Discharge Date Expected Discharge Time    Feb 1, 2025           Phone communication or documentation only - no physical contact with patient or family.     Cami Proctor RN

## 2025-02-01 VITALS
SYSTOLIC BLOOD PRESSURE: 162 MMHG | WEIGHT: 209 LBS | RESPIRATION RATE: 14 BRPM | DIASTOLIC BLOOD PRESSURE: 83 MMHG | BODY MASS INDEX: 30.96 KG/M2 | HEIGHT: 69 IN | TEMPERATURE: 97.8 F | OXYGEN SATURATION: 90 % | HEART RATE: 58 BPM

## 2025-02-01 LAB
ANION GAP SERPL CALCULATED.3IONS-SCNC: 11.1 MMOL/L (ref 5–15)
BACTERIA UR QL AUTO: NORMAL /HPF
BASOPHILS # BLD AUTO: 0.01 10*3/MM3 (ref 0–0.2)
BASOPHILS NFR BLD AUTO: 0.1 % (ref 0–1.5)
BILIRUB UR QL STRIP: NEGATIVE
BUN SERPL-MCNC: 36 MG/DL (ref 8–23)
BUN/CREAT SERPL: 25.5 (ref 7–25)
CALCIUM SPEC-SCNC: 8.9 MG/DL (ref 8.6–10.5)
CHLORIDE SERPL-SCNC: 99 MMOL/L (ref 98–107)
CLARITY UR: CLEAR
CO2 SERPL-SCNC: 24.9 MMOL/L (ref 22–29)
COLOR UR: YELLOW
CREAT SERPL-MCNC: 1.41 MG/DL (ref 0.76–1.27)
CREAT UR-MCNC: 65.4 MG/DL
DEPRECATED RDW RBC AUTO: 43.4 FL (ref 37–54)
EGFRCR SERPLBLD CKD-EPI 2021: 50.7 ML/MIN/1.73
EOSINOPHIL # BLD AUTO: 0 10*3/MM3 (ref 0–0.4)
EOSINOPHIL NFR BLD AUTO: 0 % (ref 0.3–6.2)
EOSINOPHIL SPEC QL MICRO: 0 % EOS/100 CELLS (ref 0–0)
ERYTHROCYTE [DISTWIDTH] IN BLOOD BY AUTOMATED COUNT: 13.1 % (ref 12.3–15.4)
GLUCOSE BLDC GLUCOMTR-MCNC: 143 MG/DL (ref 70–105)
GLUCOSE BLDC GLUCOMTR-MCNC: 185 MG/DL (ref 70–105)
GLUCOSE SERPL-MCNC: 143 MG/DL (ref 65–99)
GLUCOSE UR STRIP-MCNC: ABNORMAL MG/DL
HCT VFR BLD AUTO: 37.5 % (ref 37.5–51)
HGB BLD-MCNC: 12.3 G/DL (ref 13–17.7)
HGB UR QL STRIP.AUTO: ABNORMAL
HYALINE CASTS UR QL AUTO: NORMAL /LPF
IMM GRANULOCYTES # BLD AUTO: 0.07 10*3/MM3 (ref 0–0.05)
IMM GRANULOCYTES NFR BLD AUTO: 0.6 % (ref 0–0.5)
KETONES UR QL STRIP: NEGATIVE
LEUKOCYTE ESTERASE UR QL STRIP.AUTO: NEGATIVE
LYMPHOCYTES # BLD AUTO: 0.37 10*3/MM3 (ref 0.7–3.1)
LYMPHOCYTES NFR BLD AUTO: 3.4 % (ref 19.6–45.3)
MCH RBC QN AUTO: 30 PG (ref 26.6–33)
MCHC RBC AUTO-ENTMCNC: 32.8 G/DL (ref 31.5–35.7)
MCV RBC AUTO: 91.5 FL (ref 79–97)
MONOCYTES # BLD AUTO: 0.65 10*3/MM3 (ref 0.1–0.9)
MONOCYTES NFR BLD AUTO: 6 % (ref 5–12)
NEUTROPHILS NFR BLD AUTO: 89.9 % (ref 42.7–76)
NEUTROPHILS NFR BLD AUTO: 9.67 10*3/MM3 (ref 1.7–7)
NITRITE UR QL STRIP: NEGATIVE
NRBC BLD AUTO-RTO: 0 /100 WBC (ref 0–0.2)
OSMOLALITY UR: 636 MOSM/KG (ref 300–800)
PH UR STRIP.AUTO: 6 [PH] (ref 5–8)
PLATELET # BLD AUTO: 167 10*3/MM3 (ref 140–450)
PMV BLD AUTO: 12.2 FL (ref 6–12)
POTASSIUM SERPL-SCNC: 4.6 MMOL/L (ref 3.5–5.2)
PROT ?TM UR-MCNC: 462 MG/DL
PROT ?TM UR-MCNC: 471.1 MG/DL
PROT UR QL STRIP: ABNORMAL
PROT/CREAT UR: 7203.4 MG/G CREA (ref 0–200)
RBC # BLD AUTO: 4.1 10*6/MM3 (ref 4.14–5.8)
RBC # UR STRIP: NORMAL /HPF
REF LAB TEST METHOD: NORMAL
SODIUM SERPL-SCNC: 135 MMOL/L (ref 136–145)
SODIUM UR-SCNC: 96 MMOL/L
SP GR UR STRIP: 1.03 (ref 1–1.03)
SQUAMOUS #/AREA URNS HPF: NORMAL /HPF
UROBILINOGEN UR QL STRIP: ABNORMAL
WBC # UR STRIP: NORMAL /HPF
WBC NRBC COR # BLD AUTO: 10.77 10*3/MM3 (ref 3.4–10.8)

## 2025-02-01 PROCEDURE — 83935 ASSAY OF URINE OSMOLALITY: CPT | Performed by: INTERNAL MEDICINE

## 2025-02-01 PROCEDURE — 82948 REAGENT STRIP/BLOOD GLUCOSE: CPT | Performed by: INTERNAL MEDICINE

## 2025-02-01 PROCEDURE — 63710000001 PREDNISONE PER 5 MG: Performed by: HOSPITALIST

## 2025-02-01 PROCEDURE — 87205 SMEAR GRAM STAIN: CPT | Performed by: INTERNAL MEDICINE

## 2025-02-01 PROCEDURE — 84156 ASSAY OF PROTEIN URINE: CPT | Performed by: INTERNAL MEDICINE

## 2025-02-01 PROCEDURE — 82570 ASSAY OF URINE CREATININE: CPT | Performed by: INTERNAL MEDICINE

## 2025-02-01 PROCEDURE — 25810000003 SODIUM CHLORIDE 0.9 % SOLUTION: Performed by: INTERNAL MEDICINE

## 2025-02-01 PROCEDURE — 63710000001 INSULIN LISPRO (HUMAN) PER 5 UNITS: Performed by: INTERNAL MEDICINE

## 2025-02-01 PROCEDURE — 99232 SBSQ HOSP IP/OBS MODERATE 35: CPT | Performed by: STUDENT IN AN ORGANIZED HEALTH CARE EDUCATION/TRAINING PROGRAM

## 2025-02-01 PROCEDURE — 85025 COMPLETE CBC W/AUTO DIFF WBC: CPT | Performed by: INTERNAL MEDICINE

## 2025-02-01 PROCEDURE — 94761 N-INVAS EAR/PLS OXIMETRY MLT: CPT

## 2025-02-01 PROCEDURE — 84300 ASSAY OF URINE SODIUM: CPT | Performed by: INTERNAL MEDICINE

## 2025-02-01 PROCEDURE — 94799 UNLISTED PULMONARY SVC/PX: CPT

## 2025-02-01 PROCEDURE — 63710000001 PREDNISONE PER 1 MG: Performed by: HOSPITALIST

## 2025-02-01 PROCEDURE — 94664 DEMO&/EVAL PT USE INHALER: CPT

## 2025-02-01 PROCEDURE — 80048 BASIC METABOLIC PNL TOTAL CA: CPT | Performed by: INTERNAL MEDICINE

## 2025-02-01 PROCEDURE — 81001 URINALYSIS AUTO W/SCOPE: CPT | Performed by: INTERNAL MEDICINE

## 2025-02-01 PROCEDURE — 94618 PULMONARY STRESS TESTING: CPT

## 2025-02-01 RX ORDER — PREDNISONE 10 MG/1
TABLET ORAL
Qty: 9 TABLET | Refills: 0 | Status: SHIPPED | OUTPATIENT
Start: 2025-02-02 | End: 2025-02-06

## 2025-02-01 RX ORDER — RANOLAZINE 500 MG/1
500 TABLET, EXTENDED RELEASE ORAL EVERY 12 HOURS SCHEDULED
Qty: 60 TABLET | Refills: 0 | Status: SHIPPED | OUTPATIENT
Start: 2025-02-01

## 2025-02-01 RX ORDER — GUAIFENESIN/DEXTROMETHORPHAN 100-10MG/5
5 SYRUP ORAL EVERY 4 HOURS PRN
Status: DISCONTINUED | OUTPATIENT
Start: 2025-02-01 | End: 2025-02-01 | Stop reason: HOSPADM

## 2025-02-01 RX ORDER — AZITHROMYCIN 250 MG/1
250 TABLET, FILM COATED ORAL DAILY
Qty: 4 TABLET | Refills: 0 | Status: SHIPPED | OUTPATIENT
Start: 2025-02-02

## 2025-02-01 RX ORDER — AZITHROMYCIN 250 MG/1
250 TABLET, FILM COATED ORAL
Status: DISCONTINUED | OUTPATIENT
Start: 2025-02-02 | End: 2025-02-01

## 2025-02-01 RX ORDER — PREDNISONE 20 MG/1
20 TABLET ORAL DAILY
Status: DISCONTINUED | OUTPATIENT
Start: 2025-02-03 | End: 2025-02-01 | Stop reason: HOSPADM

## 2025-02-01 RX ORDER — AZITHROMYCIN 250 MG/1
500 TABLET, FILM COATED ORAL EVERY 24 HOURS
Status: DISCONTINUED | OUTPATIENT
Start: 2025-02-01 | End: 2025-02-01 | Stop reason: HOSPADM

## 2025-02-01 RX ORDER — PREDNISONE 10 MG/1
10 TABLET ORAL DAILY
Status: DISCONTINUED | OUTPATIENT
Start: 2025-02-05 | End: 2025-02-01 | Stop reason: HOSPADM

## 2025-02-01 RX ORDER — ASPIRIN 81 MG/1
81 TABLET ORAL DAILY
Start: 2025-02-01

## 2025-02-01 RX ADMIN — Medication 10 ML: at 09:58

## 2025-02-01 RX ADMIN — ISOSORBIDE MONONITRATE 60 MG: 30 TABLET, EXTENDED RELEASE ORAL at 09:57

## 2025-02-01 RX ADMIN — PREDNISONE 30 MG: 20 TABLET ORAL at 09:57

## 2025-02-01 RX ADMIN — ATORVASTATIN CALCIUM 10 MG: 10 TABLET ORAL at 09:57

## 2025-02-01 RX ADMIN — IPRATROPIUM BROMIDE AND ALBUTEROL SULFATE 3 ML: .5; 3 SOLUTION RESPIRATORY (INHALATION) at 06:49

## 2025-02-01 RX ADMIN — IPRATROPIUM BROMIDE AND ALBUTEROL SULFATE 3 ML: .5; 3 SOLUTION RESPIRATORY (INHALATION) at 10:31

## 2025-02-01 RX ADMIN — RANOLAZINE 500 MG: 500 TABLET, EXTENDED RELEASE ORAL at 09:57

## 2025-02-01 RX ADMIN — INSULIN LISPRO 2 UNITS: 100 INJECTION, SOLUTION INTRAVENOUS; SUBCUTANEOUS at 12:26

## 2025-02-01 RX ADMIN — CLOPIDOGREL BISULFATE 75 MG: 75 TABLET ORAL at 09:57

## 2025-02-01 RX ADMIN — ATENOLOL 50 MG: 50 TABLET ORAL at 09:57

## 2025-02-01 RX ADMIN — PANTOPRAZOLE SODIUM 40 MG: 40 TABLET, DELAYED RELEASE ORAL at 05:26

## 2025-02-01 RX ADMIN — GUAIFENESIN AND DEXTROMETHORPHAN 5 ML: 100; 10 SYRUP ORAL at 03:16

## 2025-02-01 RX ADMIN — MUPIROCIN 1 APPLICATION: 20 OINTMENT TOPICAL at 09:57

## 2025-02-01 RX ADMIN — OSELTAMIVIR PHOSPHATE 30 MG: 30 CAPSULE ORAL at 09:57

## 2025-02-01 RX ADMIN — ASPIRIN 81 MG: 81 TABLET, COATED ORAL at 09:57

## 2025-02-01 RX ADMIN — AZITHROMYCIN DIHYDRATE 500 MG: 250 TABLET ORAL at 12:26

## 2025-02-01 RX ADMIN — BENZOCAINE 6 MG-MENTHOL 10 MG LOZENGES 1 EACH: at 03:16

## 2025-02-01 NOTE — PROGRESS NOTES
Patient Name: Parker Guerrier  : 1945  MRN: [id number]  Primary Care Physician: Beth Young  Date of admission: 2025    Patient Care Team:  Beth Young as PCP - General (Nurse Practitioner)  Emily Rivera MD as Consulting Physician (Cardiology)        Reason for Follow up:     MARQUES vs CKD      Subjective:     Seen and examined, no distress  sCr 1.41, UOP 2.8L  Now off cardene gtt  Plans for heart cath today      Review of systems:  ROS was otherwise negative except as mentioned in the [place].       Medications:  Prior to Admission medications    Medication Sig Start Date End Date Taking? Authorizing Provider   albuterol (PROVENTIL) (2.5 MG/3ML) 0.083% nebulizer solution Take 2.5 mg by nebulization Every 4 (Four) Hours As Needed for Wheezing.    Janine Fisher MD   aspirin 325 MG tablet Take 1 tablet by mouth Daily.    Janine Fisher MD   atenolol (TENORMIN) 50 MG tablet Take 1 tablet by mouth once daily 1/15/25   Emily Rivera MD   clopidogrel (PLAVIX) 75 MG tablet Take 1 tablet by mouth once daily 1/15/25   Emily Rivera MD   ezetimibe (ZETIA) 10 MG tablet Take 1 tablet by mouth Daily. 25   Emily Rivera MD   isosorbide mononitrate (IMDUR) 60 MG 24 hr tablet Take 1 tablet by mouth twice daily 24   Emily Rivera MD   metFORMIN (GLUCOPHAGE) 500 MG tablet Take 500 mg by mouth 2 (Two) Times a Day With Meals. 1/10/19   Janine Fisher MD   nitroglycerin (Nitrostat) 0.4 MG SL tablet Place 1 tablet under the tongue Every 5 (Five) Minutes As Needed for Chest Pain. 24   Emily Rivera MD   omeprazole (priLOSEC) 20 MG capsule Take 20 mg by mouth Daily. 13   Janine Fisher MD   pravastatin (PRAVACHOL) 40 MG tablet Take 1 tablet by mouth once daily 10/15/24   Emily Rivera MD   triamcinolone (KENALOG) 0.5 % ointment Apply 1 application topically to the appropriate area as directed 2  (Two) Times a Day As Needed for Rash (elbows). 1/10/19   Provider, MD Janine     Scheduled Meds:aspirin, 81 mg, Oral, Daily  atenolol, 50 mg, Oral, Daily  atorvastatin, 10 mg, Oral, Daily  [START ON 2/2/2025] azithromycin, 250 mg, Oral, Q24H  azithromycin, 500 mg, Oral, Once  clopidogrel, 75 mg, Oral, Daily  insulin lispro, 2-7 Units, Subcutaneous, 4x Daily AC & at Bedtime  ipratropium-albuterol, 3 mL, Nebulization, 4x Daily - RT  isosorbide mononitrate, 60 mg, Oral, BID - Nitrates  mupirocin, 1 Application, Each Nare, BID  oseltamivir, 30 mg, Oral, Q12H  pantoprazole, 40 mg, Oral, Q AM  predniSONE, 30 mg, Oral, Daily   Followed by  [START ON 2/3/2025] predniSONE, 20 mg, Oral, Daily   Followed by  [START ON 2/5/2025] predniSONE, 10 mg, Oral, Daily  ranolazine, 500 mg, Oral, Q12H  sodium chloride, 10 mL, Intravenous, Q12H      Continuous Infusions:niCARdipine, 5-15 mg/hr, Last Rate: Stopped (01/31/25 1923)      PRN Meds:  acetaminophen    acetaminophen    atropine    benzocaine-menthol    senna-docusate sodium **AND** polyethylene glycol **AND** bisacodyl **AND** bisacodyl    Calcium Replacement - Follow Nurse / BPA Driven Protocol    dextrose    dextrose    glucagon (human recombinant)    guaiFENesin-dextromethorphan    HYDROmorphone    Magnesium Standard Dose Replacement - Follow Nurse / BPA Driven Protocol    nitroglycerin    nitroglycerin    ondansetron ODT **OR** ondansetron    Phosphorus Replacement - Follow Nurse / BPA Driven Protocol    Potassium Replacement - Follow Nurse / BPA Driven Protocol    sodium chloride    sodium chloride  Allergies:    Allergies   Allergen Reactions    Morphine Other (See Comments)       Objective   Exam:     Vital Signs  Temp:  [97.7 °F (36.5 °C)-98.2 °F (36.8 °C)] 98.2 °F (36.8 °C)  Heart Rate:  [51-71] 59  Resp:  [15-22] 15  BP: ()/(49-97) 157/84  SpO2:  [88 %-100 %] 96 %  on  Flow (L/min) (Oxygen Therapy):  [1-2] 2;   Device (Oxygen Therapy): nasal cannula  Body mass  index is 31.32 kg/m².  EXAM  General:   male in no acute distress.    Head:      Normocephalic and atraumatic.    Eyes:      PERRL/EOM intact, conjunctivae and sclerae clear without nystagmus.    Neck:      No masses, thyromegaly,  trachea central   Lungs:    Mild bilateral crackles  Heart:      Regular rate and rhythm, no murmur no gallop  Abd:        Soft, nontender, not distended, bowel sounds positive, no shifting dullness.  Msk:        No deformity or scoliosis noted of thoracic or lumbar spine.    Pulses:   Pulses normal in all 4 extremities.    Extremities:        No cyanosis or clubbing--positive edema.    Neuro:    No focal deficits.   alert oriented x3  Skin:       Intact without lesions or rashes.    Psych:    Alert and cooperative; normal mood and affect; normal attention span       Results Review:  I have personally reviewed most recent Data :  BMP @[facility](creatinine:10)  CBC    Results from last 7 days   Lab Units 02/01/25  0323 01/29/25  2329   WBC 10*3/mm3 10.77 4.95   HEMOGLOBIN g/dL 12.3* 10.8*   PLATELETS 10*3/mm3 167 148     CMP   Results from last 7 days   Lab Units 02/01/25  0534 01/31/25  0456 01/29/25  2329   SODIUM mmol/L 135* 132* 136   POTASSIUM mmol/L 4.6 4.6 5.0   CHLORIDE mmol/L 99 97* 100   CO2 mmol/L 24.9 25.1 23.7   BUN mg/dL 36* 33* 27*   CREATININE mg/dL 1.41* 1.47* 1.49*   GLUCOSE mg/dL 143* 157* 97   ALBUMIN g/dL  --  3.9 3.7   BILIRUBIN mg/dL  --  0.2 0.2   ALK PHOS U/L  --  71 69   AST (SGOT) U/L  --  43* 22   ALT (SGPT) U/L  --  14 8   LIPASE U/L  --   --  20     ABG      US Renal Bilateral    Result Date: 1/31/2025  Impression: Echogenic kidneys compatible with medical renal disease. No hydronephrosis. Electronically Signed: Colin Reyes MD  1/31/2025 11:22 PM EST  Workstation ID: [id number]    CT Chest Without Contrast Diagnostic    Result Date: 1/30/2025  Impression: 1. 1.5 cm focal groundglass density in the subpleural right upper lobe, favored represent focal  pneumonitis or mild atelectasis. No lung mass lesion is identified. 2. 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 Dimensions as above. 3. Severe coronary calcifications with signs of CABG. Correlate with known cardiac history. 4. Uncomplicated cholelithiasis. Electronically Signed: Orly Cervantes MD  1/30/2025 10:20 AM EST  Workstation ID: [id number]    XR Chest 1 View    Result Date: 1/29/2025  Impression: 1.Cardiomegaly with pulmonary edema. 2.Spiculated density in the right upper lobe. CT of the chest is recommended. Electronically Signed: Lv Muniz MD  1/29/2025 11:13 PM EST  Workstation ID: [id number]     Results for orders placed during the hospital encounter of 01/29/25    Adult Transthoracic Echo Complete W/ Cont if Necessary Per Protocol    Interpretation Summary    Left ventricular systolic function is hyperdynamic (EF > 70%). Calculated left ventricular EF = 70.7% Left ventricular ejection fraction appears to be 66 - 70%.    Left ventricular wall thickness is consistent with mild concentric hypertrophy.    Left ventricular diastolic function is consistent with (grade II w/high LAP) pseudonormalization.    The left atrial cavity is moderately dilated.    Mild aortic valve stenosis is present.    Estimated right ventricular systolic pressure from tricuspid regurgitation is normal (<35 mmHg).        Assessment & Plan   Assessment and Plan:         Chest pain    ASSESSMENT:  MARQUES vs CKD  Chest pain, angina vs possible non-ST elevation, possible infarction   CAD s/p CABG, PCI, stents   HTN  DM  H/o Aflutter   Newly diagnosed CHF  COPD      ECHO 1/30/25: EF 66-70%, G2DD, mild aortic stenosis           PLAN :     Patient with MARQUES vs CKD, he may be at his baseline, only previous level known to me is from 2021 which showed creatinine of 1.3. Since admission creatinine has been 1.4-1.5 mg/dL. Will check comprehensive urine studies, Renal US shows CKD, no hydronephrosis  At risk for worsening renal functions  given recent contrast nephropathy and upcoming contrast exposure with cardiac cath. Risks vs benefits discussed with patients and okay to proceed. Now off hep and nitro gtt    Not on any ACE/ARBs or diuretics, continue to hold. I will not start IVFs at this time due to underlying CHF   Watch volume status closely. Most recent BNP elevated at 2506. CXR showed cardiomegaly and pulmonary edema. Will very likely need diuretic in next day or so   Blood pressure on the high side, s/p dose of Clonidine and Hydralazine, monitor, Now off cardene gtt  Patient was able to lie flat on the bed with oxygen saturation of 97% without getting short of breath  No extra IV fluid noted needed okay for heart cath  Sodium low normal, likely from underlying volume, will monitor for now. Remaining electrolytes/acid base stable   Anemia, mild, check iron studies if begins to trend down   Influenza A, started on Tamiflu, also receiving neb txts and prednisone   Daily weights   Avoid nephrotoxins, monitor I/O   Daily labs   we will gladly follow       Note transcribed for Dr Leander Casper, APRN  [facility]  2/1/2025  10:28 EST

## 2025-02-01 NOTE — PROCEDURES
Exercise Oximetry    Patient Name:Parker Guerrier   MRN: [id number]   Date: 02/01/25             ROOM AIR BASELINE   SpO2% 95   Heart Rate 61   Blood Pressure      EXERCISE ON ROOM AIR SpO2% EXERCISE ON O2 @  LPM SpO2%   1 MINUTE 95 1 MINUTE    2 MINUTES 93 2 MINUTES    3 MINUTES 93 3 MINUTES    4 MINUTES 92 4 MINUTES    5 MINUTES 92 5 MINUTES    6 MINUTES 93 6 MINUTES               Distance Walked  6 minutes  Distance Walked   Dyspnea (Magdaleno Scale)   Dyspnea (Magdaleno Scale)   Fatigue (Magdaleno Scale)   Fatigue (Magdaleno Scale)   SpO2% Post Exercise  92 SpO2% Post Exercise   HR Post Exercise  63 HR Post Exercise   Time to Recovery   Time to Recovery     Comments: On room air, pt sats were maintained between 92-95% during the walk.  Pt used a walker and complained of blurry vision and overall weakness.

## 2025-02-01 NOTE — PROGRESS NOTES
Cardiology Progress Note      PATIENT IDENTIFICATION    Name: Parker Guerrier  Age: 79 y.o. Sex: male : 1945  MRN: [id number]    Requesting Provider    Brammell, Timothy Duane,*     LOS: 3 days     Subjective:    Interval History:  No significant overnight events.  This morning patient denies chest pain or shortness of breath.    Review of Systems:  A complete review of systems was undertaken with the pertinent cardiovascular findings listed in history of present illness and all other systems being negative.     Assessment & Plan    Impressions:  Coronary artery disease status post remote CABG and PCI in the past  Residual significant coronary disease involves circumflex artery, not amenable for intervention  Hypertension  Hyperlipidemia  CKD  Type 2 diabetes    Recommendations:  Unfortunately patient's coronary lesion felt not to be amenable for the patient  Plan to continue medical management for CAD  Continue aspirin 81, Plavix 75, ranolazine 500, atenolol 50, consider increasing imdur to 60 BID if recurrent CP.        Objective:    Medication Review:   Scheduled Meds:aspirin, 81 mg, Oral, Daily  atenolol, 50 mg, Oral, Daily  atorvastatin, 10 mg, Oral, Daily  azithromycin, 500 mg, Oral, Q24H  clopidogrel, 75 mg, Oral, Daily  insulin lispro, 2-7 Units, Subcutaneous, 4x Daily AC & at Bedtime  ipratropium-albuterol, 3 mL, Nebulization, 4x Daily - RT  isosorbide mononitrate, 60 mg, Oral, BID - Nitrates  mupirocin, 1 Application, Each Nare, BID  oseltamivir, 30 mg, Oral, Q12H  pantoprazole, 40 mg, Oral, Q AM  predniSONE, 30 mg, Oral, Daily   Followed by  [START ON 2/3/2025] predniSONE, 20 mg, Oral, Daily   Followed by  [START ON 2025] predniSONE, 10 mg, Oral, Daily  ranolazine, 500 mg, Oral, Q12H  sodium chloride, 10 mL, Intravenous, Q12H      Continuous Infusions:niCARdipine, 5-15 mg/hr, Last Rate: Stopped (25)      PRN Meds:.  acetaminophen    acetaminophen    atropine    benzocaine-menthol     senna-docusate sodium **AND** polyethylene glycol **AND** bisacodyl **AND** bisacodyl    Calcium Replacement - Follow Nurse / BPA Driven Protocol    dextrose    dextrose    glucagon (human recombinant)    guaiFENesin-dextromethorphan    HYDROmorphone    Magnesium Standard Dose Replacement - Follow Nurse / BPA Driven Protocol    nitroglycerin    nitroglycerin    ondansetron ODT **OR** ondansetron    Phosphorus Replacement - Follow Nurse / BPA Driven Protocol    Potassium Replacement - Follow Nurse / BPA Driven Protocol    sodium chloride    sodium chloride      Chest pain         Physical Exam:  General: Alert, cooperative, no distress, appears stated age  Lungs:  Clear to auscultation bilaterally, no wheezes, rhonchi or rales are noted  Chest wall: No tenderness  Heart::  Regular rate and rhythm, S1 and S2 normal, no murmur.  No rub or gallop  Abdomen: Soft, nontender, nondistended, bowel sounds active  Extremities: No cyanosis, clubbing, or edema  Pulses: 2+ and symmetric all extremities  Neuro/psych: No gross focal deficits      Vital Signs:  Vitals:    02/01/25 0957 02/01/25 1031 02/01/25 1034 02/01/25 1200   BP: 157/84   142/81   BP Location:    Right arm   Patient Position:    Lying   Pulse: 59 56 55 57   Resp:  14 14    Temp:    97.8 °F (36.6 °C)   TempSrc:    Oral   SpO2:  92% 98% 90%   Weight:       Height:         Wt Readings from Last 1 Encounters:   01/30/25 94.8 kg (209 lb)       Intake/Output Summary (Last 24 hours) at 2/1/2025 1415  Last data filed at 2/1/2025 1100  Gross per 24 hour   Intake 1900 ml   Output 2080 ml   Net -180 ml         Results Review:     CBC    Results from last 7 days   Lab Units 02/01/25  0323 01/29/25  2329   WBC 10*3/mm3 10.77 4.95   HEMOGLOBIN g/dL 12.3* 10.8*   PLATELETS 10*3/mm3 167 148     Cr Clearance Estimated Creatinine Clearance: 47.9 mL/min (A) (by C-G formula based on SCr of 1.41 mg/dL (H)).  Coag   Results from last 7 days   Lab Units 01/31/25  0638 01/31/25  0456  "01/30/25  2134 01/30/25  1931 01/30/25  1245 01/29/25  2329   INR   --   --   --   --  1.10 1.19*   APTT seconds 48.5* 159.7* 79.7* 111.6* 40.8* 36.7*     HbA1C No results found for: \"HGBA1C\"  Blood Glucose   Glucose   Date/Time Value Ref Range Status   02/01/2025 1205 185 (H) 70 - 105 mg/dL Final     Comment:     Serial Number: [id number]:  [id number]   02/01/2025 0746 143 (H) 70 - 105 mg/dL Final     Comment:     Serial Number: [id number]:  [id number]   01/31/2025 2028 243 (H) 70 - 105 mg/dL Final     Comment:     Serial Number: [id number]:  [id number]   01/31/2025 2017 225 (H) 70 - 105 mg/dL Final     Comment:     Serial Number: [id number]:  [id number]   01/31/2025 1630 166 (H) 70 - 105 mg/dL Final     Comment:     Serial Number: [id number]:  [id number]   01/31/2025 1121 141 (H) 70 - 105 mg/dL Final     Comment:     Serial Number: [id number]:  [id number]   01/31/2025 0726 158 (H) 70 - 105 mg/dL Final     Comment:     Serial Number: [id number]:  [id number]   01/30/2025 2131 151 (H) 70 - 105 mg/dL Final     Comment:     Serial Number: [id number]:  [id number]     Infection     CMP   Results from last 7 days   Lab Units 02/01/25  0534 01/31/25  0456 01/29/25  2329   SODIUM mmol/L 135* 132* 136   POTASSIUM mmol/L 4.6 4.6 5.0   CHLORIDE mmol/L 99 97* 100   CO2 mmol/L 24.9 25.1 23.7   BUN mg/dL 36* 33* 27*   CREATININE mg/dL 1.41* 1.47* 1.49*   GLUCOSE mg/dL 143* 157* 97   ALBUMIN g/dL  --  3.9 3.7   BILIRUBIN mg/dL  --  0.2 0.2   ALK PHOS U/L  --  71 69   AST (SGOT) U/L  --  43* 22   ALT (SGPT) U/L  --  14 8   LIPASE U/L  --   --  20     ABG      UA    Results from last 7 days   Lab Units 02/01/25  1114   NITRITE UA  Negative   WBC UA /HPF 0-2   BACTERIA UA /HPF None Seen   SQUAM EPITHEL UA /HPF 0-2     MIREYA  No results found for: \"POCMETH\", \"POCAMPHET\", \"POCBARBITUR\", \"POCBENZO\", \"POCCOCAINE\", \"POCOPIATES\", \"POCOXYCODO\", \"POCPHENCYC\", \"POCPROPOXY\", \"POCTHC\", " "\"POCTRICYC\"  Lysis Labs   Results from last 7 days   Lab Units 02/01/25  0534 02/01/25  0323 01/31/25  0638 01/31/25  0456 01/30/25  2134 01/30/25  1931 01/30/25  1245 01/29/25  2329   INR   --   --   --   --   --   --  1.10 1.19*   APTT seconds  --   --  48.5* 159.7* 79.7* 111.6* 40.8* 36.7*   HEMOGLOBIN g/dL  --  12.3*  --   --   --   --   --  10.8*   PLATELETS 10*3/mm3  --  167  --   --   --   --   --  148   CREATININE mg/dL 1.41*  --   --  1.47*  --   --   --  1.49*     Radiology(recent) US Renal Bilateral    Result Date: 1/31/2025  Impression: Echogenic kidneys compatible with medical renal disease. No hydronephrosis. Electronically Signed: Colin Reyes MD  1/31/2025 11:22 PM EST  Workstation ID: [id number]       Results from last 7 days   Lab Units 01/30/25  0043   HSTROP T ng/L 27*       Imaging Results (Last 24 Hours)       Procedure Component Value Units Date/Time    US Renal Bilateral [[id number]] Collected: 01/31/25 2321     Updated: 01/31/25 2324    Narrative:      US RENAL BILATERAL    Date of Exam: 1/31/2025 9:31 PM EST    Indication: MARQUES.    Comparison: Correlation with CT chest 1/30/2025.    Technique: Grayscale and color Doppler ultrasound evaluation of the kidneys and urinary bladder was performed.      Findings:  The right kidney measures 8.5 x 5.1 x 5.0 cm and the left kidney measures 10.1 x 6.4 x 4.9 cm. Kidney vascularity is grossly intact. The kidneys do appear echogenic. Small cyst identified on CT at the superior pole of the right kidney is not seen on   ultrasound. There is no solid kidney mass.  No echogenic shadowing stone.  No hydronephrosis.    Limited visualization of the urinary bladder is unremarkable. Urinary bladder volume is calculated at 140 mL.      Impression:      Impression:  Echogenic kidneys compatible with medical renal disease. No hydronephrosis.            Electronically Signed: Colin Reyes MD    1/31/2025 11:22 PM EST    Workstation ID: [id number]            Cardiac " Studies:  Echo- Results for orders placed during the hospital encounter of 01/29/25    Adult Transthoracic Echo Complete W/ Cont if Necessary Per Protocol    Interpretation Summary    Left ventricular systolic function is hyperdynamic (EF > 70%). Calculated left ventricular EF = 70.7% Left ventricular ejection fraction appears to be 66 - 70%.    Left ventricular wall thickness is consistent with mild concentric hypertrophy.    Left ventricular diastolic function is consistent with (grade II w/high LAP) pseudonormalization.    The left atrial cavity is moderately dilated.    Mild aortic valve stenosis is present.    Estimated right ventricular systolic pressure from tricuspid regurgitation is normal (<35 mmHg).    Stress Myoview-  Cath-        Keshawn Enciso MD  02/01/25  14:15 EST    Copied information has been reviewed and is current as of 02/01/25

## 2025-02-01 NOTE — DISCHARGE SUMMARY
[facility]  Discharge Summary    Date of Service: 2025  Patient Name: Parker Guerrier  : 1945  MRN: [id number]    Date of Admission: 2025  Discharge Diagnosis:     Chest pain/history of coronary artery disease/CABG  Influenza A   COPD with acute exacerbation  type 2 diabetes  chronic kidney disease stage III  Anemia      Date of Discharge: 2025  Primary Care Physician: Beth Young      Presenting Problem:   Chest pain [R07.9]    Active and Resolved Hospital Problems:  Active Hospital Problems    Diagnosis POA    **Chest pain [R07.9] Yes      Resolved Hospital Problems   No resolved problems to display.         Hospital Course     HPI:    Patient presented with issues as discussed in history and physical of 2025.    Hospital Course:   This is a 79-year-old white male who initially presented with issues for chest pain and found to be influenza positive as noted in history and physical.  He had no true temperature spike during his hospitalization.  Oxygenation at time of discharge was adequate on room air with walk test.  Blood pressure was controlled with some noted systolic hypertension at times.  Initial upper respiratory viral panel returned being positive  for influenza type A.  Chest x-ray suggested possible nodular density.  CT scan of the chest without contrast showed right upper lobe density of probable focal pneumonitis he had his noted aneurysmal changes of  aortic as well as evidence of coronary artery calcification.  Ultrasound of the kidneys showed echogenicity consistent with medical renal disease.  Urine eosinophils were negative.  Initial CBC showed a moderate anemia without leukocytosis.  Cardiac enzymes were not profoundly elevated.  He had some noted acute renal insufficiency initially was treated with IV heparin pending cardiac catheterization.  Echocardiogram showed a preserved ejection fraction.  He subsequently underwent  coronary catheterization with noted significant atherosclerotic process currently with hopes of  medical management.  He had some ongoing coarse respirations worse than his baseline was treated with course of oral prednisone and azithromycin to complete as an outpatient.  It was thought that he could be discharged home and followed up by primary care as well as cardiology and nephrology as an outpatient.                Day of Discharge     Vital Signs:  Temp:  [97.7 °F (36.5 °C)-98.2 °F (36.8 °C)] 97.8 °F (36.6 °C)  Heart Rate:  [51-71] 58  Resp:  [14-22] 14  BP: ()/(49-86) 162/83  Flow (L/min) (Oxygen Therapy):  [1-2] 2    Physical Exam:  Physical Exam  Vitals reviewed.   Constitutional:       General: He is not in acute distress.     Appearance: Normal appearance.   HENT:      Head: Normocephalic.   Cardiovascular:      Rate and Rhythm: Normal rate and regular rhythm.   Pulmonary:      Effort: Pulmonary effort is normal.      Breath sounds: Normal breath sounds.      Comments: Somewhat coarse breath sounds  Abdominal:      General: Bowel sounds are normal.      Palpations: Abdomen is soft.   Musculoskeletal:         General: No swelling.   Neurological:      Mental Status: He is alert. Mental status is at baseline.            Pertinent  and/or Most Recent Results     LAB RESULTS:      Lab 02/01/25  0323 01/31/25  0638 01/31/25  0456 01/30/25  2134 01/30/25  1931 01/30/25  1245 01/29/25  2329   WBC 10.77  --   --   --   --   --  4.95   HEMOGLOBIN 12.3*  --   --   --   --   --  10.8*   HEMATOCRIT 37.5  --   --   --   --   --  32.9*   PLATELETS 167  --   --   --   --   --  148   NEUTROS ABS 9.67*  --   --   --   --   --  4.55   IMMATURE GRANS (ABS) 0.07*  --   --   --   --   --   --    LYMPHS ABS 0.37*  --   --   --   --   --   --    MONOS ABS 0.65  --   --   --   --   --   --    EOS ABS 0.00  --   --   --   --   --   --    MCV 91.5  --   --   --   --   --  92.7   PROTIME  --   --   --   --   --  14.3* 15.2*    APTT  --  48.5* 159.7* 79.7* 111.6* 40.8* 36.7*         Lab 02/01/25  0534 01/31/25  0456 01/29/25  2329   SODIUM 135* 132* 136   POTASSIUM 4.6 4.6 5.0   CHLORIDE 99 97* 100   CO2 24.9 25.1 23.7   ANION GAP 11.1 9.9 12.3   BUN 36* 33* 27*   CREATININE 1.41* 1.47* 1.49*   EGFR 50.7* 48.2* 47.4*   GLUCOSE 143* 157* 97   CALCIUM 8.9 9.1 8.6   MAGNESIUM  --  2.1  --    PHOSPHORUS  --  4.3  --          Lab 01/31/25  0456 01/29/25  2329   TOTAL PROTEIN 6.3 6.0   ALBUMIN 3.9 3.7   GLOBULIN 2.4 2.3   ALT (SGPT) 14 8   AST (SGOT) 43* 22   BILIRUBIN 0.2 0.2   ALK PHOS 71 69   LIPASE  --  20         Lab 01/30/25  1245 01/30/25  0531 01/30/25  0043 01/29/25  2329   PROBNP  --  2,506.0*  --   --    HSTROP T  --   --  27* 25*   PROTIME 14.3*  --   --  15.2*   INR 1.10  --   --  1.19*         Lab 01/30/25  0531   CHOLESTEROL 123   LDL CHOL 61   HDL CHOL 45   TRIGLYCERIDES 90             Brief Urine Lab Results  (Last result in the past 365 days)        Color   Clarity   Blood   Leuk Est   Nitrite   Protein   CREAT   Urine HCG        02/01/25 1114 Yellow   Clear   Trace   Negative   Negative   >=300 mg/dL (3+)                 Microbiology Results (last 10 days)       Procedure Component Value - Date/Time    Eosinophil Smear - Urine, Urine, Clean Catch [[id number]]  (Normal) Collected: 02/01/25 1114    Lab Status: Final result Specimen: Urine, Clean Catch Updated: 02/01/25 1240     Eosinophil Smear 0 % EOS/100 Cells     Respiratory Panel PCR w/COVID-19(SARS-CoV-2) [facility]ITA/MARISA/BONG/PAD/[facility]/DIANE In-House, NP Swab in UTM/VTM, 2 HR TAT - Swab, Nasopharynx [[id number]]  (Abnormal) Collected: 01/29/25 2334    Lab Status: Final result Specimen: Swab from Nasopharynx Updated: 01/30/25 0027     ADENOVIRUS, PCR Not Detected     Coronavirus 229E Not Detected     Coronavirus HKU1 Not Detected     Coronavirus NL63 Not Detected     Coronavirus OC43 Not Detected     COVID19 Not Detected     Human Metapneumovirus Not Detected     Human  Rhinovirus/Enterovirus Not Detected     Influenza A H1 2009 PCR Detected     Influenza B PCR Not Detected     Parainfluenza Virus 1 Not Detected     Parainfluenza Virus 2 Not Detected     Parainfluenza Virus 3 Not Detected     Parainfluenza Virus 4 Not Detected     RSV, PCR Not Detected     Bordetella pertussis pcr Not Detected     Bordetella parapertussis PCR Not Detected     Chlamydophila pneumoniae PCR Not Detected     Mycoplasma pneumo by PCR Not Detected    Narrative:      In the setting of a positive respiratory panel with a viral infection PLUS a negative procalcitonin without other underlying concern for bacterial infection, consider observing off antibiotics or discontinuation of antibiotics and continue supportive care. If the respiratory panel is positive for atypical bacterial infection (Bordetella pertussis, Chlamydophila pneumoniae, or Mycoplasma pneumoniae), consider antibiotic de-escalation to target atypical bacterial infection.            US Renal Bilateral    Result Date: 1/31/2025  Impression: Impression: Echogenic kidneys compatible with medical renal disease. No hydronephrosis. Electronically Signed: Colin Reyes MD  1/31/2025 11:22 PM EST  Workstation ID: [id number]    CT Chest Without Contrast Diagnostic    Result Date: 1/30/2025  Impression: Impression: 1. 1.5 cm focal groundglass density in the subpleural right upper lobe, favored represent focal pneumonitis or mild atelectasis. No lung mass lesion is identified. 2. 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 Dimensions as above. 3. Severe coronary calcifications with signs of CABG. Correlate with known cardiac history. 4. Uncomplicated cholelithiasis. Electronically Signed: Orly Cervantes MD  1/30/2025 10:20 AM EST  Workstation ID: [id number]    XR Chest 1 View    Result Date: 1/29/2025  Impression: Impression: 1.Cardiomegaly with pulmonary edema. 2.Spiculated density in the right upper lobe. CT of the chest is recommended.  Electronically Signed: Lv Muniz MD  1/29/2025 11:13 PM EST  Workstation ID: [id number]             Results for orders placed during the hospital encounter of 01/29/25    Adult Transthoracic Echo Complete W/ Cont if Necessary Per Protocol    Interpretation Summary    Left ventricular systolic function is hyperdynamic (EF > 70%). Calculated left ventricular EF = 70.7% Left ventricular ejection fraction appears to be 66 - 70%.    Left ventricular wall thickness is consistent with mild concentric hypertrophy.    Left ventricular diastolic function is consistent with (grade II w/high LAP) pseudonormalization.    The left atrial cavity is moderately dilated.    Mild aortic valve stenosis is present.    Estimated right ventricular systolic pressure from tricuspid regurgitation is normal (<35 mmHg).      Labs Pending at Discharge:  Pending Results       Procedure [Order ID] Specimen - Date/Time    Creatinine Urine Random (kidney function) GFR component - Urine, Clean Catch [[id number]] Collected: 02/01/25 1114    Specimen: Urine, Clean Catch Updated: 02/01/25 1118    Protein / Creatinine Ratio, Urine - Urine, Clean Catch [[id number]] Collected: 02/01/25 1114    Specimen: Urine, Clean Catch Updated: 02/01/25 1118            Procedures Performed  Procedure(s):  LEFT HEART CATH with possible PCI  02/01 0943 Note By: Sakshi Rendon, CRT      Consults:   Consults       Date and Time Order Name Status Description    1/30/2025  2:47 PM Inpatient Nephrology Consult Completed     1/29/2025 10:43 PM Inpatient Cardiology Consult                Discharge Details        Discharge Medications        New Medications        Instructions Start Date   aspirin 81 MG EC tablet  Replaces: aspirin 325 MG tablet   81 mg, Oral, Daily      azithromycin 250 MG tablet  Commonly known as: Zithromax   250 mg, Oral, Daily   Start Date: February 2, 2025     predniSONE 10 MG tablet  Commonly known as: DELTASONE   Take 3 tablets by mouth Daily for 1 day,  THEN 2 tablets Daily for 2 days, THEN 1 tablet Daily for 2 days.   Start Date: February 2, 2025     ranolazine 500 MG 12 hr tablet  Commonly known as: RANEXA   500 mg, Oral, Every 12 Hours Scheduled             Continue These Medications        Instructions Start Date   albuterol (2.5 MG/3ML) 0.083% nebulizer solution  Commonly known as: PROVENTIL   2.5 mg, Nebulization, Every 4 Hours PRN      atenolol 50 MG tablet  Commonly known as: TENORMIN   50 mg, Oral, Daily      clopidogrel 75 MG tablet  Commonly known as: PLAVIX   Take 1 tablet by mouth once daily      ezetimibe 10 MG tablet  Commonly known as: ZETIA   10 mg, Oral, Daily      isosorbide mononitrate 60 MG 24 hr tablet  Commonly known as: IMDUR   Take 1 tablet by mouth twice daily      metFORMIN 500 MG tablet  Commonly known as: GLUCOPHAGE   500 mg, Oral, 2 Times Daily With Meals      nitroglycerin 0.4 MG SL tablet  Commonly known as: Nitrostat   0.4 mg, Sublingual, Every 5 Minutes PRN      omeprazole 20 MG capsule  Commonly known as: priLOSEC   20 mg, Oral, Daily      pravastatin 40 MG tablet  Commonly known as: PRAVACHOL   Take 1 tablet by mouth once daily      triamcinolone 0.5 % ointment  Commonly known as: KENALOG   1 application , Topical, 2 Times Daily PRN             Stop These Medications      aspirin 325 MG tablet  Replaced by: aspirin 81 MG EC tablet              Allergies   Allergen Reactions    Morphine Other (See Comments)         Discharge Disposition: home  Home or Self Care    Diet:  Hospital:  Diet Order   Procedures    Diet: Cardiac; Healthy Heart (2-3 Na+); Fluid Consistency: Thin (IDDSI 0)         Discharge Activity:         CODE STATUS:  Code Status and Medical Interventions: No CPR (Do Not Attempt to Resuscitate); Limited Support; No intubation (DNI)   Ordered at: 01/29/25 1804     Medical Intervention Limits:    No intubation (DNI)     Code Status (Patient has no pulse and is not breathing):    No CPR (Do Not Attempt to Resuscitate)      Medical Interventions (Patient has pulse or is breathing):    Limited Support         No future appointments.    Additional Instructions for the Follow-ups that You Need to Schedule       Discharge Follow-up with PCP   As directed       Currently Documented PCP:    Beth Young    PCP Phone Number:    [phone]     Follow Up Details: one week        Discharge Follow-up with Specified Provider: cardiology; 1 Month   As directed      To: cardiology   Follow Up: 1 Month        Discharge Follow-up with Specified Provider: nephrology; 3 Weeks   As directed      To: nephrology   Follow Up: 3 Weeks                Time spent on Discharge including face to face service:  45 minutes    Signature: Electronically signed by Timothy Duane Brammell, MD, 02/01/25, 14:59 EST.  [facility] Hospitalist Team

## 2025-02-02 LAB
QT INTERVAL: 426 MS
QTC INTERVAL: 468 MS

## 2025-02-02 NOTE — CASE MANAGEMENT/SOCIAL WORK
Case Management Discharge Note      Final Note: Home with spouse      Transportation Services  Private: Car    Final Discharge Disposition Code: 01 - home or self-care

## 2025-02-03 NOTE — PAYOR COMM NOTE
"This is discharge notification for Parker Guerrier   Reference/Auth # [id number]   Pt discharged on 2/1/25    Florecita Sumner, RN, BSN  Utilization Review Nurse  [facility]  Direct & confidential phone # [phone]  Fax # [phone]      Parker Gurerier (79 y.o. Male)       Date of Birth   1945    Social Security Number       Address   [address] [place] [address] [address] [place] [address]    Home Phone   [phone]    MRN   [id number]       Restorationist   Unknown    Marital Status                               Admission Date   1/29/25    Admission Type   Urgent    Admitting Provider   Darci Izquierdo MD    Attending Provider       Department, Room/Bed   [facility] CARDIOVASCULAR CARE UNIT, 3103/1       Discharge Date   2/1/2025    Discharge Disposition   Home or Self Care    Discharge Destination                                 Attending Provider: (none)   Allergies: Morphine    Isolation: None   Infection: Influenza (01/30/25)   Code Status: Prior    Ht: 174 cm (68.5\")   Wt: 94.8 kg (209 lb)    Admission Cmt: None   Principal Problem: Chest pain [R07.9]                   Active Insurance as of 1/29/2025       Primary Coverage       Payor Plan Insurance Group Employer/Plan Group    MISC MEDICARE REPLACEMENT MISC MCARE REPLACEMENT PPO [id number]       Coverage Address Coverage Phone Number Coverage Fax Number Effective Dates    [address] [phone]  1/1/2024 - None Entered    [place] [id number]         Subscriber Name Subscriber Birth Date Member ID       PARKER GUERRIER 1945 [id number]                     Emergency Contacts        (Rel.) Home Phone Work Phone Mobile Phone    Ivy Reilly (Daughter) -- -- [phone]    Jessica Guerrier (Spouse) [phone] -- --    Adrienne Charles (Daughter) -- -- [phone]                 Discharge Summary        Brammell, Timothy Duane, MD at 02/01/25 [facility]8                       [facility] Medicine Services  Discharge " Summary    Date of Service: 2025  Patient Name: Parker Guerrier  : 1945  MRN: [id number]    Date of Admission: 2025  Discharge Diagnosis:     Chest pain/history of coronary artery disease/CABG  Influenza A   COPD with acute exacerbation  type 2 diabetes  chronic kidney disease stage III  Anemia      Date of Discharge: 2025  Primary Care Physician: Beth Young      Presenting Problem:   Chest pain [R07.9]    Active and Resolved Hospital Problems:  Active Hospital Problems    Diagnosis POA    **Chest pain [R07.9] Yes      Resolved Hospital Problems   No resolved problems to display.         Hospital Course     HPI:    Patient presented with issues as discussed in history and physical of 2025.    Hospital Course:   This is a 79-year-old white male who initially presented with issues for chest pain and found to be influenza positive as noted in history and physical.  He had no true temperature spike during his hospitalization.  Oxygenation at time of discharge was adequate on room air with walk test.  Blood pressure was controlled with some noted systolic hypertension at times.  Initial upper respiratory viral panel returned being positive  for influenza type A.  Chest x-ray suggested possible nodular density.  CT scan of the chest without contrast showed right upper lobe density of probable focal pneumonitis he had his noted aneurysmal changes of  aortic as well as evidence of coronary artery calcification.  Ultrasound of the kidneys showed echogenicity consistent with medical renal disease.  Urine eosinophils were negative.  Initial CBC showed a moderate anemia without leukocytosis.  Cardiac enzymes were not profoundly elevated.  He had some noted acute renal insufficiency initially was treated with IV heparin pending cardiac catheterization.  Echocardiogram showed a preserved ejection fraction.  He subsequently underwent coronary catheterization with noted significant  atherosclerotic process currently with hopes of  medical management.  He had some ongoing coarse respirations worse than his baseline was treated with course of oral prednisone and azithromycin to complete as an outpatient.  It was thought that he could be discharged home and followed up by primary care as well as cardiology and nephrology as an outpatient.                Day of Discharge     Vital Signs:  Temp:  [97.7 °F (36.5 °C)-98.2 °F (36.8 °C)] 97.8 °F (36.6 °C)  Heart Rate:  [51-71] 58  Resp:  [14-22] 14  BP: ()/(49-86) 162/83  Flow (L/min) (Oxygen Therapy):  [1-2] 2    Physical Exam:  Physical Exam  Vitals reviewed.   Constitutional:       General: He is not in acute distress.     Appearance: Normal appearance.   HENT:      Head: Normocephalic.   Cardiovascular:      Rate and Rhythm: Normal rate and regular rhythm.   Pulmonary:      Effort: Pulmonary effort is normal.      Breath sounds: Normal breath sounds.      Comments: Somewhat coarse breath sounds  Abdominal:      General: Bowel sounds are normal.      Palpations: Abdomen is soft.   Musculoskeletal:         General: No swelling.   Neurological:      Mental Status: He is alert. Mental status is at baseline.            Pertinent  and/or Most Recent Results     LAB RESULTS:      Lab 02/01/25  0323 01/31/25  0638 01/31/25  0456 01/30/25  2134 01/30/25  1931 01/30/25  1245 01/29/25  2329   WBC 10.77  --   --   --   --   --  4.95   HEMOGLOBIN 12.3*  --   --   --   --   --  10.8*   HEMATOCRIT 37.5  --   --   --   --   --  32.9*   PLATELETS 167  --   --   --   --   --  148   NEUTROS ABS 9.67*  --   --   --   --   --  4.55   IMMATURE GRANS (ABS) 0.07*  --   --   --   --   --   --    LYMPHS ABS 0.37*  --   --   --   --   --   --    MONOS ABS 0.65  --   --   --   --   --   --    EOS ABS 0.00  --   --   --   --   --   --    MCV 91.5  --   --   --   --   --  92.7   PROTIME  --   --   --   --   --  14.3* 15.2*   APTT  --  48.5* 159.7* 79.7* 111.6* 40.8* 36.7*          Lab 02/01/25  0534 01/31/25  0456 01/29/25  2329   SODIUM 135* 132* 136   POTASSIUM 4.6 4.6 5.0   CHLORIDE 99 97* 100   CO2 24.9 25.1 23.7   ANION GAP 11.1 9.9 12.3   BUN 36* 33* 27*   CREATININE 1.41* 1.47* 1.49*   EGFR 50.7* 48.2* 47.4*   GLUCOSE 143* 157* 97   CALCIUM 8.9 9.1 8.6   MAGNESIUM  --  2.1  --    PHOSPHORUS  --  4.3  --          Lab 01/31/25  0456 01/29/25  2329   TOTAL PROTEIN 6.3 6.0   ALBUMIN 3.9 3.7   GLOBULIN 2.4 2.3   ALT (SGPT) 14 8   AST (SGOT) 43* 22   BILIRUBIN 0.2 0.2   ALK PHOS 71 69   LIPASE  --  20         Lab 01/30/25  1245 01/30/25  0531 01/30/25  0043 01/29/25  2329   PROBNP  --  2,506.0*  --   --    HSTROP T  --   --  27* 25*   PROTIME 14.3*  --   --  15.2*   INR 1.10  --   --  1.19*         Lab 01/30/25  0531   CHOLESTEROL 123   LDL CHOL 61   HDL CHOL 45   TRIGLYCERIDES 90             Brief Urine Lab Results  (Last result in the past 365 days)        Color   Clarity   Blood   Leuk Est   Nitrite   Protein   CREAT   Urine HCG        02/01/25 1114 Yellow   Clear   Trace   Negative   Negative   >=300 mg/dL (3+)                 Microbiology Results (last 10 days)       Procedure Component Value - Date/Time    Eosinophil Smear - Urine, Urine, Clean Catch [[id number]]  (Normal) Collected: 02/01/25 1114    Lab Status: Final result Specimen: Urine, Clean Catch Updated: 02/01/25 1240     Eosinophil Smear 0 % EOS/100 Cells     Respiratory Panel PCR w/COVID-19(SARS-CoV-2) RITA/MARISA/BONG/PAD/COR/DIANE In-House, NP Swab in UTM/VTM, 2 HR TAT - Swab, Nasopharynx [[id number]]  (Abnormal) Collected: 01/29/25 2334    Lab Status: Final result Specimen: Swab from Nasopharynx Updated: 01/30/25 0027     ADENOVIRUS, PCR Not Detected     Coronavirus 229E Not Detected     Coronavirus HKU1 Not Detected     Coronavirus NL63 Not Detected     Coronavirus OC43 Not Detected     COVID19 Not Detected     Human Metapneumovirus Not Detected     Human Rhinovirus/Enterovirus Not Detected     Influenza A H1 2009 PCR  Detected     Influenza B PCR Not Detected     Parainfluenza Virus 1 Not Detected     Parainfluenza Virus 2 Not Detected     Parainfluenza Virus 3 Not Detected     Parainfluenza Virus 4 Not Detected     RSV, PCR Not Detected     Bordetella pertussis pcr Not Detected     Bordetella parapertussis PCR Not Detected     Chlamydophila pneumoniae PCR Not Detected     Mycoplasma pneumo by PCR Not Detected    Narrative:      In the setting of a positive respiratory panel with a viral infection PLUS a negative procalcitonin without other underlying concern for bacterial infection, consider observing off antibiotics or discontinuation of antibiotics and continue supportive care. If the respiratory panel is positive for atypical bacterial infection (Bordetella pertussis, Chlamydophila pneumoniae, or Mycoplasma pneumoniae), consider antibiotic de-escalation to target atypical bacterial infection.            US Renal Bilateral    Result Date: 1/31/2025  Impression: Impression: Echogenic kidneys compatible with medical renal disease. No hydronephrosis. Electronically Signed: Colin Reyes MD  1/31/2025 11:22 PM EST  Workstation ID: [id number]    CT Chest Without Contrast Diagnostic    Result Date: 1/30/2025  Impression: Impression: 1. 1.5 cm focal groundglass density in the subpleural right upper lobe, favored represent focal pneumonitis or mild atelectasis. No lung mass lesion is identified. 2. 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 Dimensions as above. 3. Severe coronary calcifications with signs of CABG. Correlate with known cardiac history. 4. Uncomplicated cholelithiasis. Electronically Signed: Orly Cervantes MD  1/30/2025 10:20 AM EST  Workstation ID: [id number]    XR Chest 1 View    Result Date: 1/29/2025  Impression: Impression: 1.Cardiomegaly with pulmonary edema. 2.Spiculated density in the right upper lobe. CT of the chest is recommended. Electronically Signed: Lv Muniz MD  1/29/2025 11:13 PM EST   Workstation ID: [id number]             Results for orders placed during the hospital encounter of 01/29/25    Adult Transthoracic Echo Complete W/ Cont if Necessary Per Protocol    Interpretation Summary    Left ventricular systolic function is hyperdynamic (EF > 70%). Calculated left ventricular EF = 70.7% Left ventricular ejection fraction appears to be 66 - 70%.    Left ventricular wall thickness is consistent with mild concentric hypertrophy.    Left ventricular diastolic function is consistent with (grade II w/high LAP) pseudonormalization.    The left atrial cavity is moderately dilated.    Mild aortic valve stenosis is present.    Estimated right ventricular systolic pressure from tricuspid regurgitation is normal (<35 mmHg).      Labs Pending at Discharge:  Pending Results       Procedure [Order ID] Specimen - Date/Time    Creatinine Urine Random (kidney function) GFR component - Urine, Clean Catch [[id number]] Collected: 02/01/25 1114    Specimen: Urine, Clean Catch Updated: 02/01/25 1118    Protein / Creatinine Ratio, Urine - Urine, Clean Catch [[id number]] Collected: 02/01/25 1114    Specimen: Urine, Clean Catch Updated: 02/01/25 1118            Procedures Performed  Procedure(s):  LEFT HEART CATH with possible PCI  02/01 0943 Note By: Sakshi Rendon, CRT      Consults:   Consults       Date and Time Order Name Status Description    1/30/2025  2:47 PM Inpatient Nephrology Consult Completed     1/29/2025 10:43 PM Inpatient Cardiology Consult                Discharge Details        Discharge Medications        New Medications        Instructions Start Date   aspirin 81 MG EC tablet  Replaces: aspirin 325 MG tablet   81 mg, Oral, Daily      azithromycin 250 MG tablet  Commonly known as: Zithromax   250 mg, Oral, Daily   Start Date: February 2, 2025     predniSONE 10 MG tablet  Commonly known as: DELTASONE   Take 3 tablets by mouth Daily for 1 day, THEN 2 tablets Daily for 2 days, THEN 1 tablet Daily for 2  days.   Start Date: February 2, 2025     ranolazine 500 MG 12 hr tablet  Commonly known as: RANEXA   500 mg, Oral, Every 12 Hours Scheduled             Continue These Medications        Instructions Start Date   albuterol (2.5 MG/3ML) 0.083% nebulizer solution  Commonly known as: PROVENTIL   2.5 mg, Nebulization, Every 4 Hours PRN      atenolol 50 MG tablet  Commonly known as: TENORMIN   50 mg, Oral, Daily      clopidogrel 75 MG tablet  Commonly known as: PLAVIX   Take 1 tablet by mouth once daily      ezetimibe 10 MG tablet  Commonly known as: ZETIA   10 mg, Oral, Daily      isosorbide mononitrate 60 MG 24 hr tablet  Commonly known as: IMDUR   Take 1 tablet by mouth twice daily      metFORMIN 500 MG tablet  Commonly known as: GLUCOPHAGE   500 mg, Oral, 2 Times Daily With Meals      nitroglycerin 0.4 MG SL tablet  Commonly known as: Nitrostat   0.4 mg, Sublingual, Every 5 Minutes PRN      omeprazole 20 MG capsule  Commonly known as: priLOSEC   20 mg, Oral, Daily      pravastatin 40 MG tablet  Commonly known as: PRAVACHOL   Take 1 tablet by mouth once daily      triamcinolone 0.5 % ointment  Commonly known as: KENALOG   1 application , Topical, 2 Times Daily PRN             Stop These Medications      aspirin 325 MG tablet  Replaced by: aspirin 81 MG EC tablet              Allergies   Allergen Reactions    Morphine Other (See Comments)         Discharge Disposition: home  Home or Self Care    Diet:  Hospital:  Diet Order   Procedures    Diet: Cardiac; Healthy Heart (2-3 Na+); Fluid Consistency: Thin (IDDSI 0)         Discharge Activity:         CODE STATUS:  Code Status and Medical Interventions: No CPR (Do Not Attempt to Resuscitate); Limited Support; No intubation (DNI)   Ordered at: 01/29/25 6875     Medical Intervention Limits:    No intubation (DNI)     Code Status (Patient has no pulse and is not breathing):    No CPR (Do Not Attempt to Resuscitate)     Medical Interventions (Patient has pulse or is breathing):     Limited Support         No future appointments.    Additional Instructions for the Follow-ups that You Need to Schedule       Discharge Follow-up with PCP   As directed       Currently Documented PCP:    Beth Young    PCP Phone Number:    [phone]     Follow Up Details: one week        Discharge Follow-up with Specified Provider: cardiology; 1 Month   As directed      To: cardiology   Follow Up: 1 Month        Discharge Follow-up with Specified Provider: nephrology; 3 Weeks   As directed      To: nephrology   Follow Up: 3 Weeks                Time spent on Discharge including face to face service:  45 minutes    Signature: Electronically signed by Timothy Duane Brammell, MD, 02/01/25, 14:59 EST.  [facility]     Electronically signed by Brammell, Timothy Duane, MD at 02/01/25 4915

## 2025-02-05 LAB
QT INTERVAL: 474 MS
QTC INTERVAL: 493 MS

## 2025-02-17 NOTE — PROGRESS NOTES
Enter Query Response Below      Query Response: Acute diastolic congestive heart failure ruled in             If applicable, please update the problem list.

## 2025-03-11 RX ORDER — ISOSORBIDE MONONITRATE 60 MG/1
60 TABLET, EXTENDED RELEASE ORAL 2 TIMES DAILY
Qty: 180 TABLET | Refills: 0 | Status: SHIPPED | OUTPATIENT
Start: 2025-03-11

## 2025-04-10 RX ORDER — PRAVASTATIN SODIUM 40 MG
40 TABLET ORAL DAILY
Qty: 90 TABLET | Refills: 1 | Status: SHIPPED | OUTPATIENT
Start: 2025-04-10

## 2025-06-16 RX ORDER — ISOSORBIDE MONONITRATE 60 MG/1
60 TABLET, EXTENDED RELEASE ORAL 2 TIMES DAILY
Qty: 180 TABLET | Refills: 1 | Status: SHIPPED | OUTPATIENT
Start: 2025-06-16

## 2025-07-08 RX ORDER — ATENOLOL 50 MG/1
50 TABLET ORAL DAILY
Qty: 90 TABLET | Refills: 0 | Status: SHIPPED | OUTPATIENT
Start: 2025-07-08

## 2025-07-08 RX ORDER — CLOPIDOGREL BISULFATE 75 MG/1
75 TABLET ORAL DAILY
Qty: 90 TABLET | Refills: 0 | Status: SHIPPED | OUTPATIENT
Start: 2025-07-08

## (undated) DEVICE — CATH DIAG IMPULSE FL4 6F 100CM

## (undated) DEVICE — GW PTFE EMERALD HEPCOAT FC J TIP STD .035 3MM 150CM

## (undated) DEVICE — CATH DIAG IMPULSE AL3 6F 100CM

## (undated) DEVICE — CATH DIAG IMPULSE FR4 6F 100CM

## (undated) DEVICE — KT DYEVERT PLS EZ W/SMART SYR FOR HI VISC CONTRST DISP

## (undated) DEVICE — CATH DIAG IMPULSE FL5 6F 100CM

## (undated) DEVICE — DGW .035 FC J3MM 150CM TEF HEP: Brand: EMERALD

## (undated) DEVICE — CATH DIAG IMPULSE IM 5F 100CM

## (undated) DEVICE — CATH DIAG IMPULSE PIG .056 6F 110CM

## (undated) DEVICE — PINNACLE INTRODUCER SHEATH: Brand: PINNACLE

## (undated) DEVICE — CATH DIAG IMPULSE MPA 6F 100CM

## (undated) DEVICE — CATH DIAG IMPULSE IMT 5F 100CM

## (undated) DEVICE — ELECTRD DEFIB M/FUNC PROPADZ RADIOL 2PK

## (undated) DEVICE — CATH DIAG IMPULSE IMT 6F 100CM

## (undated) DEVICE — DGW .035 FC J3MM 260CM TEF: Brand: EMERALD

## (undated) DEVICE — PK TRY HEART CATH 50

## (undated) DEVICE — ST ACC MICROPUNCTURE STFF/CANN PLAT/TP 4F 21G 40CM

## (undated) DEVICE — GW EMR FIX EXCHG J STD .035 3MM 260CM